# Patient Record
Sex: MALE | Race: WHITE | NOT HISPANIC OR LATINO | Employment: FULL TIME | URBAN - METROPOLITAN AREA
[De-identification: names, ages, dates, MRNs, and addresses within clinical notes are randomized per-mention and may not be internally consistent; named-entity substitution may affect disease eponyms.]

---

## 2019-07-13 ENCOUNTER — HOSPITAL ENCOUNTER (EMERGENCY)
Facility: HOSPITAL | Age: 44
Discharge: HOME | End: 2019-07-13
Attending: FAMILY MEDICINE
Payer: MEDICAID

## 2019-07-13 VITALS
WEIGHT: 150 LBS | DIASTOLIC BLOOD PRESSURE: 87 MMHG | HEIGHT: 70 IN | BODY MASS INDEX: 21.47 KG/M2 | HEART RATE: 78 BPM | TEMPERATURE: 98 F | OXYGEN SATURATION: 98 % | RESPIRATION RATE: 18 BRPM | SYSTOLIC BLOOD PRESSURE: 134 MMHG

## 2019-07-13 DIAGNOSIS — S00.83XA CONTUSION OF FACE, INITIAL ENCOUNTER: Primary | ICD-10-CM

## 2019-07-13 DIAGNOSIS — S01.511A LIP LACERATION, INITIAL ENCOUNTER: ICD-10-CM

## 2019-07-13 DIAGNOSIS — S02.40EA CLOSED FRACTURE OF RIGHT ZYGOMATIC ARCH, INITIAL ENCOUNTER: ICD-10-CM

## 2019-07-13 PROCEDURE — 90715 TDAP VACCINE 7 YRS/> IM: CPT | Mod: ER | Performed by: FAMILY MEDICINE

## 2019-07-13 PROCEDURE — 96372 THER/PROPH/DIAG INJ SC/IM: CPT | Mod: ER

## 2019-07-13 PROCEDURE — 90471 IMMUNIZATION ADMIN: CPT | Mod: ER | Performed by: FAMILY MEDICINE

## 2019-07-13 PROCEDURE — 63600175 PHARM REV CODE 636 W HCPCS: Mod: ER | Performed by: FAMILY MEDICINE

## 2019-07-13 PROCEDURE — 99284 EMERGENCY DEPT VISIT MOD MDM: CPT | Mod: 25,ER

## 2019-07-13 PROCEDURE — 25000003 PHARM REV CODE 250: Mod: ER | Performed by: FAMILY MEDICINE

## 2019-07-13 RX ORDER — DICLOFENAC SODIUM 50 MG/1
50 TABLET, DELAYED RELEASE ORAL 2 TIMES DAILY PRN
Qty: 30 TABLET | Refills: 0 | Status: SHIPPED | OUTPATIENT
Start: 2019-07-13 | End: 2020-08-09 | Stop reason: CLARIF

## 2019-07-13 RX ORDER — HYDROCODONE BITARTRATE AND ACETAMINOPHEN 5; 325 MG/1; MG/1
1 TABLET ORAL EVERY 8 HOURS PRN
Qty: 15 TABLET | Refills: 0 | Status: ON HOLD | OUTPATIENT
Start: 2019-07-13 | End: 2019-10-07 | Stop reason: CLARIF

## 2019-07-13 RX ORDER — KETOROLAC TROMETHAMINE 30 MG/ML
60 INJECTION, SOLUTION INTRAMUSCULAR; INTRAVENOUS
Status: COMPLETED | OUTPATIENT
Start: 2019-07-13 | End: 2019-07-13

## 2019-07-13 RX ORDER — HYDROCODONE BITARTRATE AND ACETAMINOPHEN 5; 325 MG/1; MG/1
1 TABLET ORAL
Status: COMPLETED | OUTPATIENT
Start: 2019-07-13 | End: 2019-07-13

## 2019-07-13 RX ORDER — SULFAMETHOXAZOLE AND TRIMETHOPRIM 800; 160 MG/1; MG/1
1 TABLET ORAL 2 TIMES DAILY
Qty: 20 TABLET | Refills: 0 | Status: SHIPPED | OUTPATIENT
Start: 2019-07-13 | End: 2019-07-23

## 2019-07-13 RX ORDER — SULFAMETHOXAZOLE AND TRIMETHOPRIM 800; 160 MG/1; MG/1
1 TABLET ORAL
Status: COMPLETED | OUTPATIENT
Start: 2019-07-13 | End: 2019-07-13

## 2019-07-13 RX ADMIN — SULFAMETHOXAZOLE AND TRIMETHOPRIM 1 TABLET: 800; 160 TABLET ORAL at 10:07

## 2019-07-13 RX ADMIN — KETOROLAC TROMETHAMINE 60 MG: 30 INJECTION, SOLUTION INTRAMUSCULAR; INTRAVENOUS at 09:07

## 2019-07-13 RX ADMIN — CLOSTRIDIUM TETANI TOXOID ANTIGEN (FORMALDEHYDE INACTIVATED), CORYNEBACTERIUM DIPHTHERIAE TOXOID ANTIGEN (FORMALDEHYDE INACTIVATED), BORDETELLA PERTUSSIS TOXOID ANTIGEN (GLUTARALDEHYDE INACTIVATED), BORDETELLA PERTUSSIS FILAMENTOUS HEMAGGLUTININ ANTIGEN (FORMALDEHYDE INACTIVATED), BORDETELLA PERTUSSIS PERTACTIN ANTIGEN, AND BORDETELLA PERTUSSIS FIMBRIAE 2/3 ANTIGEN 0.5 ML: 5; 2; 2.5; 5; 3; 5 INJECTION, SUSPENSION INTRAMUSCULAR at 10:07

## 2019-07-13 RX ADMIN — HYDROCODONE BITARTRATE AND ACETAMINOPHEN 1 TABLET: 5; 325 TABLET ORAL at 11:07

## 2019-07-14 NOTE — ED NOTES
"Pt states "I can't sit here all day. If I don't get something done soon I want to leave." NAD noted. Informed pt radiology tech waiting to take him to CT scan.   "

## 2019-07-14 NOTE — ED PROVIDER NOTES
Encounter Date: 7/13/2019       History     Chief Complaint   Patient presents with    Fall     Patient reports he slipped on some steps about 2 hours ago and hit the right side of head and lip on the concrete slab, patient's significiant other reports patient has a hx of seizures and she is not sure if patient slipped and fell or had a seizure.      43-year-old male had a syncopal episode and sustained injury to his phase.  He hit concrete steps.  Patient blacked out for few seconds.  He did know what happened.  He had history of seizures but did not have postictal state today.  Is not on any medications.  His seizure free for last 1 year.  Small cut to upper lip with bruising.  No bleeding from mouth or nose. Patient is awake alert and oriented on arrival to ED with normal ambulation.  No vomiting. No headache. No neck pain.     The history is provided by the patient.     Review of patient's allergies indicates:  No Known Allergies  Past Medical History:   Diagnosis Date    Hypertension     Pancreatitis     Seizures      History reviewed. No pertinent surgical history.  History reviewed. No pertinent family history.  Social History     Tobacco Use    Smoking status: Current Every Day Smoker     Packs/day: 0.50     Types: Cigarettes    Smokeless tobacco: Never Used   Substance Use Topics    Alcohol use: Yes     Alcohol/week: 3.6 oz     Types: 6 Cans of beer per week     Comment: reports 6 pack of beer every other day    Drug use: Yes     Types: Marijuana     Comment: occassionally     Review of Systems   Constitutional: Negative for activity change, appetite change, chills and fever.   HENT: Negative for congestion, ear discharge, rhinorrhea, sinus pressure, sinus pain, sore throat and trouble swallowing.    Eyes: Negative for photophobia, pain, discharge, redness, itching and visual disturbance.   Respiratory: Negative for cough, chest tightness, shortness of breath and wheezing.    Cardiovascular:  Negative for chest pain, palpitations and leg swelling.   Gastrointestinal: Negative for abdominal distention, abdominal pain, constipation, diarrhea, nausea and vomiting.   Genitourinary: Negative for dysuria, flank pain, frequency and hematuria.   Musculoskeletal: Negative for back pain, gait problem, neck pain and neck stiffness.   Skin: Negative for rash and wound.   Neurological: Negative for dizziness, tremors, seizures, syncope, speech difficulty, weakness, light-headedness, numbness and headaches.   Psychiatric/Behavioral: Negative for behavioral problems, confusion, hallucinations and sleep disturbance. The patient is not nervous/anxious.    All other systems reviewed and are negative.      Physical Exam     Initial Vitals [07/13/19 2042]   BP Pulse Resp Temp SpO2   (!) 160/99 101 18 97.9 °F (36.6 °C) 99 %      MAP       --         Physical Exam    Nursing note and vitals reviewed.  Constitutional: Vital signs are normal. He appears well-developed and well-nourished. He is active.   HENT:   Head: Normocephalic and atraumatic.       Right Ear: External ear normal.   Left Ear: External ear normal.   Nose: Nose normal.   Mouth/Throat: Oropharynx is clear and moist and mucous membranes are normal.       0.5 cm laceration to upper lip with surrounding contusion.  Right temple area contusion.   Eyes: Conjunctivae, EOM and lids are normal. Pupils are equal, round, and reactive to light.   Neck: Trachea normal, normal range of motion and full passive range of motion without pain. Neck supple. Normal range of motion present. No neck rigidity.   Cardiovascular: Normal rate, regular rhythm, S1 normal, S2 normal, normal heart sounds, intact distal pulses and normal pulses.   Pulmonary/Chest: Breath sounds normal. No respiratory distress. He has no wheezes. He has no rhonchi. He has no rales. He exhibits no tenderness.   Abdominal: Soft. Normal appearance and bowel sounds are normal. He exhibits no distension. There is  no tenderness. There is no rebound and no guarding.   Musculoskeletal: Normal range of motion.   Lymphadenopathy:     He has no cervical adenopathy.   Neurological: He is alert and oriented to person, place, and time. He has normal strength and normal reflexes. No cranial nerve deficit or sensory deficit. GCS score is 15. GCS eye subscore is 4. GCS verbal subscore is 5. GCS motor subscore is 6.   Skin: Skin is warm and intact. Capillary refill takes less than 2 seconds. No abrasion, no bruising and no rash noted.   Psychiatric: He has a normal mood and affect. His speech is normal and behavior is normal. Judgment and thought content normal. He is not actively hallucinating. Cognition and memory are normal. He is attentive.         ED Course   Procedures  Labs Reviewed - No data to display       Imaging Results    None          Medical Decision Making:   Initial Assessment:   Slip and fall with contusion to face.  Small lip laceration.  Differential Diagnosis:   Facial fractures, contusion of face.  Laceration of lip, contusion of lip.  Clinical Tests:   Radiological Study: Ordered and Reviewed  ED Management:  Right is atraumatic arch with fracture.  Patient has been treated with Toradol, Norco, Tdap.  [facility]erral [facility].  Patient is also advised to [facility] if he could not wait for the appointment.  Continue pain medication at home along with soft and liquid diet.  Follow up ER with any worsening pain. Nausea, vomiting, seizures.                      Clinical Impression:       ICD-10-CM ICD-9-CM   1. Contusion of face, initial encounter S00.83XA 920   2. Lip laceration, initial encounter S01.511A 873.43   3. Closed fracture of right zygomatic arch, initial encounter S02.40EA 802.4         Disposition:   Disposition: Discharged  Condition: Stable                        Rufino Marcum MD  07/14/19 8672

## 2019-08-07 ENCOUNTER — HOSPITAL ENCOUNTER (EMERGENCY)
Facility: HOSPITAL | Age: 44
Discharge: HOME | End: 2019-08-07
Attending: EMERGENCY MEDICINE
Payer: MEDICAID

## 2019-08-07 VITALS
DIASTOLIC BLOOD PRESSURE: 111 MMHG | TEMPERATURE: 97 F | WEIGHT: 150 LBS | BODY MASS INDEX: 21.47 KG/M2 | OXYGEN SATURATION: 95 % | RESPIRATION RATE: 16 BRPM | HEART RATE: 113 BPM | HEIGHT: 70 IN | SYSTOLIC BLOOD PRESSURE: 185 MMHG

## 2019-08-07 DIAGNOSIS — F10.20 ALCOHOLISM: Primary | ICD-10-CM

## 2019-08-07 PROCEDURE — 99281 EMR DPT VST MAYX REQ PHY/QHP: CPT

## 2019-08-07 RX ORDER — PHENYTOIN SODIUM 100 MG/1
CAPSULE, EXTENDED RELEASE ORAL 3 TIMES DAILY
COMMUNITY
End: 2020-08-09 | Stop reason: CLARIF

## 2019-08-07 RX ORDER — ALPRAZOLAM 1 MG/1
1 TABLET ORAL 2 TIMES DAILY
Status: ON HOLD | COMMUNITY
End: 2019-10-07 | Stop reason: CLARIF

## 2019-08-08 NOTE — ED PROVIDER NOTES
Encounter Date: 8/7/2019    SCRIBE #1 NOTE: I, Amado Bland, am scribing for, and in the presence of, Christian Regalado MD.       History     Chief Complaint   Patient presents with    Detox     pt states I am in real bad shape with my drinking, reports drinking 1/5 per day. Pt states he wants to get help getting detoxed. + for etoh currently       Time seen by provider: 8:00 PM on 08/07/2019    Addy Alberto is a 43 y.o. male with seizures, pancreatitis, HTN, who presents to the ED with an onset of alcohol intoxication. The patiens states he drinks at least a fifth of alcohol per day, sometimes more. He reports drinking 1.5 pints of alcohol the day of arrival, and acknowledges that he's currently intoxicated. He states he has a history of seizures, and notes he takes Dilantin to treat the symptoms. He reports going to rehab on the [facility] ~3 weeks PTA, but only staying 2 days. The patient denies any other symptoms at this time. No PSHx noted. A drug allergy to penicillin cited.    The history is provided by the patient.     Review of patient's allergies indicates:   Allergen Reactions    Pcn [penicillins]      Past Medical History:   Diagnosis Date    Hypertension     Pancreatitis     Seizures      No past surgical history on file.  No family history on file.  Social History     Tobacco Use    Smoking status: Current Every Day Smoker     Packs/day: 0.50     Types: Cigarettes    Smokeless tobacco: Never Used   Substance Use Topics    Alcohol use: Yes     Alcohol/week: 3.6 oz     Types: 6 Cans of beer per week     Comment: reports 6 pack of beer every other day    Drug use: Yes     Types: Marijuana     Comment: occassionally     Review of Systems   Constitutional: Negative for activity change, diaphoresis and fever.        +EtOH intoxication   HENT: Negative for drooling, rhinorrhea, sore throat and trouble swallowing.    Eyes: Negative for pain and visual disturbance.   Respiratory: Negative for cough,  shortness of breath and stridor.    Cardiovascular: Negative for chest pain and leg swelling.   Gastrointestinal: Negative for abdominal distention, abdominal pain, constipation and vomiting.   Genitourinary: Negative for discharge, dysuria and hematuria.   Musculoskeletal: Negative for gait problem.   Skin: Negative for rash.   Neurological: Negative for seizures, facial asymmetry and headaches.   Psychiatric/Behavioral: Negative for hallucinations and suicidal ideas.       Physical Exam     Initial Vitals [08/07/19 1928]   BP Pulse Resp Temp SpO2   (!) 177/114 (!) 113 16 96.9 °F (36.1 °C) 95 %      MAP       --         Physical Exam    Nursing note and vitals reviewed.  Constitutional: He appears well-developed. No distress.   Smells of EtOH, and anxious.    HENT:   Head: Normocephalic and atraumatic.   Nose: Nose normal.   Eyes: EOM are normal.   Neck: Neck supple. No tracheal deviation present. No JVD present.   Cardiovascular: Regular rhythm, normal heart sounds and intact distal pulses. Tachycardia present.  Exam reveals no gallop and no friction rub.    No murmur heard.  Pulmonary/Chest: Breath sounds normal. No respiratory distress. He has no wheezes. He has no rhonchi. He has no rales.   Abdominal: Soft. Bowel sounds are normal. There is no tenderness.   Musculoskeletal: Normal range of motion.   Neurological: He is alert and oriented to person, place, and time. No cranial nerve deficit.   Skin: Skin is warm and dry. Capillary refill takes less than 2 seconds. No rash noted.   Psychiatric: He has a normal mood and affect.         ED Course   Procedures  Labs Reviewed - No data to display       Imaging Results    None          Medical Decision Making:   History:   Old Medical Records: I decided to obtain old medical records.  ED Management:  This patient appears mildly intoxicated.  They admit to using alcohol today.  Patient's mental status correlates with alcohol use.  There is no evidence of new trauma on  full head-to-toe exam.  Patients father is with him asking for options for detox. Ambulating without difficulty. Pt and father given resources for detox. Stable for discharge.            Scribe Attestation:   Scribe #1: I performed the above scribed service and the documentation accurately describes the services I performed. I attest to the accuracy of the note.      Attending Attestation:     Physician Attestation for Scribe:    I, Dr. Christian Regalado, personally performed the services described in this documentation.   All medical record entries made by the scribe were at my direction and in my presence.   I have reviewed the chart and agree that the record is accurate and complete.   Christian Regalado MD  10:41 PM 08/07/2019     DISCLAIMER: This note was prepared with [employer] voice recognition transcription software. Garbled syntax, mangled pronouns, and other bizarre constructions may be attributed to that software system.             Clinical Impression:       ICD-10-CM ICD-9-CM   1. Alcoholism F10.20 303.90         Disposition:   Disposition: Discharged  Condition: Stable                        Christian Regalado MD  08/07/19 2244

## 2019-08-08 NOTE — ED NOTES
Assumed care:  Addy Alberto awake, alert and oriented x 3, skin warm and dry, in NAD with family at bedside.  Patient states that he drinks a fifth per day and wants to stop.  Last drink 30 minutes ago.    Patient identifiers for Addy Alberto checked and correct.  LOC:  Addy Alberto is awake, alert, and aware of environment with an appropriate affect. He is oriented x 3 and speaking appropriately.  APPEARANCE:  He is resting comfortably and in no acute distress. He is clean and well groomed, patient's clothing is properly fastened.  SKIN:  The skin is warm and dry. He has normal skin turgor and moist mucus membranes. Skin is intact; no bruising or breakdown noted.  MUSCULOSKELETAL:  He is moving all extremities well, no obvious deformities noted. Pulses intact.   RESPIRATORY:  Airway is open and patent. Respirations are spontaneous and non-labored with normal effort and rate.  CARDIAC:  tachycardia. No peripheral edema noted. Capillary refill < 3 seconds.  ABDOMEN:  No distention noted.  Soft and non-tender upon palpation.  NEUROLOGICAL:  PERRL. Facial expression is symmetrical. Hand grasps are equal bilaterally. Normal sensation in all extremities when touched with finger.  Allergies reported:    Review of patient's allergies indicates:   Allergen Reactions    Pcn [penicillins]      OTHER NOTES:

## 2019-08-12 ENCOUNTER — HOSPITAL ENCOUNTER (EMERGENCY)
Facility: HOSPITAL | Age: 44
Discharge: HOME | End: 2019-08-12
Attending: EMERGENCY MEDICINE
Payer: MEDICAID

## 2019-08-12 VITALS
TEMPERATURE: 98 F | SYSTOLIC BLOOD PRESSURE: 164 MMHG | BODY MASS INDEX: 21.47 KG/M2 | HEART RATE: 75 BPM | HEIGHT: 70 IN | RESPIRATION RATE: 18 BRPM | DIASTOLIC BLOOD PRESSURE: 88 MMHG | OXYGEN SATURATION: 99 % | WEIGHT: 150 LBS

## 2019-08-12 DIAGNOSIS — I10 HYPERTENSION, UNSPECIFIED TYPE: Primary | ICD-10-CM

## 2019-08-12 DIAGNOSIS — R07.9 CHEST PAIN: ICD-10-CM

## 2019-08-12 LAB
ALBUMIN SERPL BCP-MCNC: 3.9 G/DL (ref 3.5–5.2)
ALP SERPL-CCNC: 84 U/L (ref 55–135)
ALT SERPL W/O P-5'-P-CCNC: 75 U/L (ref 10–44)
AMPHET+METHAMPHET UR QL: NEGATIVE
ANION GAP SERPL CALC-SCNC: 9 MMOL/L (ref 8–16)
APAP SERPL-MCNC: <10 UG/ML (ref 10–20)
AST SERPL-CCNC: 93 U/L (ref 10–40)
BARBITURATES UR QL SCN>200 NG/ML: NORMAL
BASOPHILS # BLD AUTO: 0.06 K/UL (ref 0–0.2)
BASOPHILS NFR BLD: 0.8 % (ref 0–1.9)
BENZODIAZ UR QL SCN>200 NG/ML: NEGATIVE
BILIRUB SERPL-MCNC: 0.7 MG/DL (ref 0.1–1)
BNP SERPL-MCNC: 69 PG/ML (ref 0–99)
BUN SERPL-MCNC: 9 MG/DL (ref 6–20)
BZE UR QL SCN: NEGATIVE
CALCIUM SERPL-MCNC: 9.7 MG/DL (ref 8.7–10.5)
CANNABINOIDS UR QL SCN: NORMAL
CHLORIDE SERPL-SCNC: 99 MMOL/L (ref 95–110)
CO2 SERPL-SCNC: 28 MMOL/L (ref 23–29)
CREAT SERPL-MCNC: 1 MG/DL (ref 0.5–1.4)
CREAT UR-MCNC: 67 MG/DL (ref 23–375)
DIFFERENTIAL METHOD: ABNORMAL
EOSINOPHIL # BLD AUTO: 0.1 K/UL (ref 0–0.5)
EOSINOPHIL NFR BLD: 0.8 % (ref 0–8)
ERYTHROCYTE [DISTWIDTH] IN BLOOD BY AUTOMATED COUNT: 15.4 % (ref 11.5–14.5)
EST. GFR  (AFRICAN AMERICAN): >60 ML/MIN/1.73 M^2
EST. GFR  (NON AFRICAN AMERICAN): >60 ML/MIN/1.73 M^2
GLUCOSE SERPL-MCNC: 116 MG/DL (ref 70–110)
HCT VFR BLD AUTO: 44.9 % (ref 40–54)
HGB BLD-MCNC: 14.9 G/DL (ref 14–18)
IMM GRANULOCYTES # BLD AUTO: 0.04 K/UL (ref 0–0.04)
IMM GRANULOCYTES NFR BLD AUTO: 0.5 % (ref 0–0.5)
INR PPP: 0.9
LYMPHOCYTES # BLD AUTO: 1.6 K/UL (ref 1–4.8)
LYMPHOCYTES NFR BLD: 20.1 % (ref 18–48)
MCH RBC QN AUTO: 33.8 PG (ref 27–31)
MCHC RBC AUTO-ENTMCNC: 33.2 G/DL (ref 32–36)
MCV RBC AUTO: 102 FL (ref 82–98)
MONOCYTES # BLD AUTO: 0.9 K/UL (ref 0.3–1)
MONOCYTES NFR BLD: 11.9 % (ref 4–15)
NEUTROPHILS # BLD AUTO: 5.1 K/UL (ref 1.8–7.7)
NEUTROPHILS NFR BLD: 65.9 % (ref 38–73)
NRBC BLD-RTO: 0 /100 WBC
OPIATES UR QL SCN: NEGATIVE
PCP UR QL SCN>25 NG/ML: NEGATIVE
PLATELET # BLD AUTO: 355 K/UL (ref 150–350)
PMV BLD AUTO: 9.3 FL (ref 9.2–12.9)
POTASSIUM SERPL-SCNC: 4.6 MMOL/L (ref 3.5–5.1)
PROT SERPL-MCNC: 8 G/DL (ref 6–8.4)
PROTHROMBIN TIME: 12.1 SEC (ref 11.7–14)
RBC # BLD AUTO: 4.41 M/UL (ref 4.6–6.2)
SALICYLATES SERPL-MCNC: 14.1 MG/DL (ref 15–30)
SODIUM SERPL-SCNC: 136 MMOL/L (ref 136–145)
TOXICOLOGY INFORMATION: NORMAL
TROPONIN I SERPL DL<=0.01 NG/ML-MCNC: <0.03 NG/ML (ref 0.02–0.04)
WBC # BLD AUTO: 7.75 K/UL (ref 3.9–12.7)

## 2019-08-12 PROCEDURE — 84484 ASSAY OF TROPONIN QUANT: CPT

## 2019-08-12 PROCEDURE — 80053 COMPREHEN METABOLIC PANEL: CPT

## 2019-08-12 PROCEDURE — 83880 ASSAY OF NATRIURETIC PEPTIDE: CPT

## 2019-08-12 PROCEDURE — 80307 DRUG TEST PRSMV CHEM ANLYZR: CPT

## 2019-08-12 PROCEDURE — 93005 ELECTROCARDIOGRAM TRACING: CPT

## 2019-08-12 PROCEDURE — 99285 EMERGENCY DEPT VISIT HI MDM: CPT

## 2019-08-12 PROCEDURE — 85610 PROTHROMBIN TIME: CPT

## 2019-08-12 PROCEDURE — 85025 COMPLETE CBC W/AUTO DIFF WBC: CPT

## 2019-08-12 PROCEDURE — 25000003 PHARM REV CODE 250: Performed by: EMERGENCY MEDICINE

## 2019-08-12 RX ORDER — HYDROCHLOROTHIAZIDE 25 MG/1
25 TABLET ORAL DAILY
Qty: 30 TABLET | Refills: 0 | Status: SHIPPED | OUTPATIENT
Start: 2019-08-12 | End: 2020-08-11

## 2019-08-12 RX ORDER — PHENYTOIN SODIUM 100 MG/1
CAPSULE, EXTENDED RELEASE ORAL 3 TIMES DAILY
COMMUNITY
End: 2019-10-04 | Stop reason: SDUPTHER

## 2019-08-12 RX ORDER — CLONIDINE HYDROCHLORIDE 0.1 MG/1
0.1 TABLET ORAL
Status: COMPLETED | OUTPATIENT
Start: 2019-08-12 | End: 2019-08-12

## 2019-08-12 RX ADMIN — CLONIDINE HYDROCHLORIDE 0.1 MG: 0.1 TABLET ORAL at 03:08

## 2019-08-13 NOTE — ED PROVIDER NOTES
Encounter Date: 8/12/2019       History     Chief Complaint   Patient presents with    Hypertension     for 6 days, headache     Chief complaint is mild throbbing headache located occipital area and base of the neck area.  The patient is here basically because he has been checking his blood pressure closely over the last week plus he has decreased his regular alcohol intake trying to get off alcohol.  At the present time his blood pressures in the 160 range and his headache is gone.  He states he feels well. He denies any earache sore throat runny nose fever chills.  He does complain of some clear eye drainage over the last week.  No vomiting no diarrhea no troubles urinating no chest pain or shortness of breath. No weakness. No confusion        Review of patient's allergies indicates:   Allergen Reactions    Penicillins      Past Medical History:   Diagnosis Date    Pancreatitis     Seizures      History reviewed. No pertinent surgical history.  History reviewed. No pertinent family history.  Social History     Tobacco Use    Smoking status: Current Some Day Smoker     Packs/day: 1.00     Types: Cigarettes   Substance Use Topics    Alcohol use: Yes     Comment: states 1 week detoxing self of etoh, couple pints daily     Drug use: Not on file     Review of Systems   Constitutional: Negative for chills and fever.   HENT: Negative for ear pain, rhinorrhea and sore throat.    Eyes: Negative for pain and visual disturbance.   Respiratory: Negative for cough and shortness of breath.    Cardiovascular: Negative for chest pain and palpitations.   Gastrointestinal: Negative for abdominal pain, constipation, diarrhea, nausea and vomiting.   Genitourinary: Negative for dysuria, frequency, hematuria and urgency.   Musculoskeletal: Negative for back pain, joint swelling and myalgias.   Skin: Negative for rash.   Neurological: Positive for headaches. Negative for dizziness, seizures and weakness.   Psychiatric/Behavioral:  Negative for dysphoric mood. The patient is not nervous/anxious.        Physical Exam     Initial Vitals [08/12/19 1502]   BP Pulse Resp Temp SpO2   (!) 212/122 86 18 97.9 °F (36.6 °C) 100 %      MAP       --         Physical Exam    Nursing note and vitals reviewed.  Constitutional: He appears well-developed and well-nourished.   HENT:   Head: Normocephalic and atraumatic.   Eyes: Conjunctivae, EOM and lids are normal. Pupils are equal, round, and reactive to light.   Neck: Trachea normal. Neck supple. No thyroid mass present.   Cardiovascular: Normal rate, regular rhythm and normal heart sounds.   Pulmonary/Chest: Breath sounds normal. No respiratory distress.   Abdominal: Soft. Bowel sounds are normal. There is no tenderness.   Musculoskeletal: Normal range of motion.   Neurological: He is alert and oriented to person, place, and time. He has normal strength and normal reflexes. No cranial nerve deficit or sensory deficit.   Skin: Skin is warm and dry.   Psychiatric: He has a normal mood and affect. His speech is normal and behavior is normal. Judgment and thought content normal.         ED Course   Procedures  Labs Reviewed   CBC W/ AUTO DIFFERENTIAL - Abnormal; Notable for the following components:       Result Value    RBC 4.41 (*)     Mean Corpuscular Volume 102 (*)     Mean Corpuscular Hemoglobin 33.8 (*)     RDW 15.4 (*)     Platelets 355 (*)     All other components within normal limits   COMPREHENSIVE METABOLIC PANEL - Abnormal; Notable for the following components:    Glucose 116 (*)     AST 93 (*)     ALT 75 (*)     All other components within normal limits   SALICYLATE LEVEL - Abnormal; Notable for the following components:    Salicylate Lvl 14.1 (*)     All other components within normal limits   B-TYPE NATRIURETIC PEPTIDE   TROPONIN I   PROTIME-INR   ACETAMINOPHEN LEVEL   DRUG SCREEN PANEL, URINE EMERGENCY    Narrative:     Specimen Source->Urine        ECG Results          EKG 12-lead (In process)   Result time 08/12/19 18:39:40    In process by Interface, Lab In [facility] (08/12/19 18:39:40)                 Narrative:    Test Reason : R07.9,    Vent. Rate : 080 BPM     Atrial Rate : 080 BPM     P-R Int : 128 ms          QRS Dur : 098 ms      QT Int : 380 ms       P-R-T Axes : 043 083 031 degrees     QTc Int : 438 ms    Normal sinus rhythm  Normal ECG  No previous ECGs available    Referred By: [email or web address]   SELF           Confirmed By:                             Imaging Results          X-Ray Chest PA And Lateral (Final result)  Result time 08/12/19 16:05:16    Final result by Mykel Teague MD (08/12/19 16:05:16)                 Impression:      No acute cardiac or pulmonary process.      Electronically signed by: Mykel Teague MD  Date:    08/12/2019  Time:    16:05             Narrative:    CLINICAL HISTORY:  ([id number])42 y/o  (1975) M    Chest Pain;    TECHNIQUE:  (A#[id number], exam time 8/12/2019 16:04)    XR CHEST PA AND LATERAL [id number]    COMPARISON:  None available.    FINDINGS:  The lungs are clear. Costophrenic angles are seen without effusion. No pneumothorax is identified. The heart is normal in size. The mediastinum is within normal limits. Osseous structures appear within normal limits. The visualized upper abdomen is unremarkable.                               CT Head Without Contrast (Final result)  Result time 08/12/19 15:59:28    Final result by Mykel Teague MD (08/12/19 15:59:28)                 Impression:      No acute intracranial process      Electronically signed by: Mykel Teague MD  Date:    08/12/2019  Time:    15:59             Narrative:    CLINICAL HISTORY:  ([id number] y/o  (1975) M    Abnormal blood-pressure reading, without diagnosis;    TECHNIQUE:  (A#[id number], exam time 8/12/2019 15:54)    CT HEAD WITHOUT CONTRAST [id number]    Axial CT of the brain without contrast using soft tissue and bone algorithm. Please note in the acute setting if there is a  clinical concern for an acute stroke MRI would be more sensitive/specific for evaluation of ischemia.    CMS MANDATED QUALITY DATA - CT RADIATION - 436    All CT scans at this facility utilize dose modulation, iterative reconstruction, and/or weight based dosing when appropriate to reduce radiation dose to as low as reasonably achievable.    COMPARISON:  None available.    FINDINGS:  No acute intracranial hemorrhage, edema or mass effect, and no acute parenchymal abnormality. There is no hydrocephalus, herniation or midline shift, and the basal and suprasellar cisterns are within normal limits. The osseous structures show no acute skull fracture. The ventricles and sulci are normal. There is normal gray white differentiation. Orbital contents appear within normal limits. External auditory canals are unremarkable. The visualized paranasal sinuses and mastoid air cells are essentially clear.                                                      Clinical Impression:       ICD-10-CM ICD-9-CM   1. Hypertension, unspecified type I10 401.9   2. Chest pain R07.9 786.50                                Lolis Santizo MD  08/12/19 8045

## 2019-08-13 NOTE — DISCHARGE INSTRUCTIONS
Please return for severe headache nausea vomiting weakness trouble with urine vision confusion chest pain

## 2019-08-16 LAB
BARBITURATES UR QL SCN: POSITIVE NG/ML
LABORATORY COMMENT REPORT: ABNORMAL

## 2019-09-10 DIAGNOSIS — G40.909 EPILEPSY, UNSPECIFIED, NOT INTRACTABLE, WITHOUT STATUS EPILEPTICUS: Primary | ICD-10-CM

## 2019-10-02 PROBLEM — R07.9 CHEST PAIN: Status: ACTIVE | Noted: 2019-10-02

## 2019-10-07 PROBLEM — I10 ESSENTIAL HYPERTENSION: Status: ACTIVE | Noted: 2019-10-07

## 2019-10-07 PROBLEM — I50.31 ACUTE DIASTOLIC HEART FAILURE: Status: ACTIVE | Noted: 2019-10-07

## 2019-10-07 PROBLEM — F10.10 ETOH ABUSE: Status: ACTIVE | Noted: 2019-10-07

## 2019-10-13 ENCOUNTER — NURSE TRIAGE (OUTPATIENT)
Dept: ADMINISTRATIVE | Facility: CLINIC | Age: 44
End: 2019-10-13

## 2019-10-13 NOTE — TELEPHONE ENCOUNTER
Sister is calling because pt is having severe pain in right arm between elbow and wrist, she also states he has a lot of bruising now. Pt states that the pain feels like it goes all the way to the bone, advised her to take him to the ER for provider assessment, caller agreed.     Reason for Disposition   Severe pain in the incision    Additional Information   Negative: [1] Major abdominal surgical incision AND [2] wound gaping open AND [3] visible internal organs   Negative: Sounds like a life-threatening emergency to the triager   Negative: [1] Bleeding from incision AND [2] won't stop after 10 minutes of direct pressure   Negative: [1] Widespread rash AND [2] bright red, sunburn-like    Protocols used: POST-OP INCISION SYMPTOMS-A-AH

## 2019-10-15 ENCOUNTER — HOSPITAL ENCOUNTER (EMERGENCY)
Facility: HOSPITAL | Age: 44
Discharge: HOME | End: 2019-10-15
Attending: FAMILY MEDICINE
Payer: MEDICAID

## 2019-10-15 VITALS
SYSTOLIC BLOOD PRESSURE: 116 MMHG | OXYGEN SATURATION: 98 % | TEMPERATURE: 98 F | RESPIRATION RATE: 17 BRPM | WEIGHT: 150 LBS | HEART RATE: 102 BPM | HEIGHT: 70 IN | DIASTOLIC BLOOD PRESSURE: 86 MMHG | BODY MASS INDEX: 21.47 KG/M2

## 2019-10-15 DIAGNOSIS — M79.603 ARM PAIN: ICD-10-CM

## 2019-10-15 DIAGNOSIS — I70.208 RADIAL ARTERY OCCLUSION, RIGHT: Primary | ICD-10-CM

## 2019-10-15 PROCEDURE — 99284 EMERGENCY DEPT VISIT MOD MDM: CPT | Mod: 25,ER

## 2019-10-15 PROCEDURE — 25000003 PHARM REV CODE 250: Mod: ER | Performed by: PHYSICIAN ASSISTANT

## 2019-10-15 RX ORDER — HYDROCODONE BITARTRATE AND ACETAMINOPHEN 5; 325 MG/1; MG/1
1 TABLET ORAL EVERY 6 HOURS PRN
Qty: 12 TABLET | Refills: 0 | Status: SHIPPED | OUTPATIENT
Start: 2019-10-15 | End: 2019-10-18

## 2019-10-15 RX ORDER — HYDROCODONE BITARTRATE AND ACETAMINOPHEN 5; 325 MG/1; MG/1
1 TABLET ORAL
Status: COMPLETED | OUTPATIENT
Start: 2019-10-15 | End: 2019-10-15

## 2019-10-15 RX ADMIN — HYDROCODONE BITARTRATE AND ACETAMINOPHEN 1 TABLET: 5; 325 TABLET ORAL at 05:10

## 2019-10-15 NOTE — ED PROVIDER NOTES
Encounter Date: 10/15/2019       History     Chief Complaint   Patient presents with    Arm Pain     Pt states had angiogram last week and has had continued pain to right wrist/arm after procedure.  Pt had f/u appt today and office called and rescheduled for Thursday.       44-year-old male presents to the emergency department for evaluation of one-week history of right forearm pain status post angiogram.  He reports that he had an angiogram performed on 10/07/2019 by Dr. Ge and has had a constant, sharp, achy pain in his forearm and wrist since that time.  He denies any numbness, tingling, weakness or swelling to the hands.  He has attempted treatment at home with Tylenol with no relief of symptoms.  He reports that he was post have a follow-up with his cardiologist today, however it got rescheduled and he wanted to get his arm evaluated.  He reports his last dose of Tylenol was this morning.  He denies any associated symptoms including fever, headache, dizziness, chest pain, shortness of breath, abdominal pain or vomiting. Patient reports that he had a PICC line and arterial line placed in that arm during the procedure.        Review of patient's allergies indicates:   Allergen Reactions    Penicillins      Past Medical History:   Diagnosis Date    Hypertension     Pancreatitis     Seizures      Past Surgical History:   Procedure Laterality Date    LEFT HEART CATHETERIZATION Left 10/7/2019    Procedure: Left heart cath;  Surgeon: Sergio Ge MD;  Location: [facility] CATH LAB;  Service: Cardiology;  Laterality: Left;     History reviewed. No pertinent family history.  Social History     Tobacco Use    Smoking status: Current Some Day Smoker     Packs/day: 1.00     Types: Cigarettes   Substance Use Topics    Alcohol use: Yes     Comment: states 1 week detoxing self of etoh, couple pints daily     Drug use: Yes     Types: Marijuana     Comment: occassionally     Review of Systems   Constitutional:  Negative for activity change, appetite change and fever.   HENT: Negative for congestion, nosebleeds, rhinorrhea, sore throat, trouble swallowing and voice change.    Eyes: Negative for photophobia and visual disturbance.   Respiratory: Negative for cough, shortness of breath and wheezing.    Cardiovascular: Negative for chest pain, palpitations and leg swelling.   Gastrointestinal: Negative for abdominal pain, constipation, diarrhea, nausea and vomiting.   Genitourinary: Negative for dysuria.   Musculoskeletal: Positive for arthralgias. Negative for back pain, joint swelling, neck pain and neck stiffness.   Skin: Negative for rash.   Neurological: Negative for dizziness, syncope, weakness, light-headedness, numbness and headaches.   Hematological: Does not bruise/bleed easily.       Physical Exam     Initial Vitals [10/15/19 1653]   BP Pulse Resp Temp SpO2   (!) 126/92 (!) 112 20 97.9 °F (36.6 °C) 96 %      MAP       --         Physical Exam    Nursing note and vitals reviewed.  Constitutional: He appears well-developed and well-nourished. He is not diaphoretic. No distress.   HENT:   Head: Normocephalic and atraumatic.   Right Ear: External ear normal.   Left Ear: External ear normal.   Mouth/Throat: Oropharynx is clear and moist. No oropharyngeal exudate.   Eyes: Conjunctivae and EOM are normal. Pupils are equal, round, and reactive to light.   Neck: Normal range of motion. Neck supple.   Cardiovascular: Normal rate, regular rhythm and normal heart sounds.   Pulmonary/Chest: Breath sounds normal. No respiratory distress. He has no wheezes. He has no rhonchi. He has no rales. He exhibits no tenderness.   Abdominal: Soft. He exhibits no distension. There is no tenderness. There is no rebound.   Musculoskeletal:        Right shoulder: He exhibits normal range of motion, no tenderness and no bony tenderness.        Right elbow: He exhibits normal range of motion. No tenderness found.        Right wrist: He exhibits  normal range of motion, no tenderness and no bony tenderness.        Right forearm: He exhibits tenderness. He exhibits no bony tenderness, no swelling and no edema.        Right hand: He exhibits normal range of motion, no tenderness, no bony tenderness, normal capillary refill and no swelling. Normal sensation noted. Normal strength noted.   Lymphadenopathy:     He has no cervical adenopathy.   Neurological: He is alert and oriented to person, place, and time.   Skin: Skin is warm and dry.   Psychiatric: He has a normal mood and affect.         ED Course   Procedures  Labs Reviewed - No data to display       Imaging Results    None          Medical Decision Making:   Initial Assessment:   44-year-old male presents for evaluation of acute arm pain status post angiogram.  Physical exam reveals a nontoxic-appearing male in no acute distress.  Patient is afebrile, mildly tachycardic but other vital signs within normal limits.  Neurological exam reveals an alert and oriented patient.  Examination of the right upper extremity reveals tenderness to palpation noted over the dorsal aspect of the proximal right forearm.  No erythema, edema or ecchymosis noted. No bony instability or crepitus noted.  Radial and ulnar pulses able to be palpated. Capillary refill less than 3 sec.  Differential Diagnosis:   Ultrasounds of the arteries and veins ordered to assess for possible serious etiology including thrombus.  Contusion    ED Management:  Ultrasound of the veins reveals no evidence of DVT.  Ultrasound of the arteries reveal occlusion of the distal right radial artery.  The right common carotid artery, subclavian artery, axillary artery, brachial artery and ulnar artery all patent. These findings were discussed at length with the patient verbalizes understanding and agreement course of treatment.  Instructed the patient to keep the appointment with his his cardiologist for re-evaluation in 2 days.  Instructed patient to return  to the emergency department immediately for any new or worsening symptoms.   evaluated this patient and is in agreement with the course of treatment.              Attending Attestation:     Physician Attestation Statement for NP/PA:   I have conducted a face to face encounter with this patient in addition to the NP/PA, due to Medical Complexity    Other NP/PA Attestation Additions:    History of Present Illness: Patient presents with right forearm pain following a cardiac catheterization and art line placement in the right radial artery.   Physical Exam: There are no obvious deformities of the forearm or hand.  He has good capillary refill in the fingers.  There is some tenderness in the forearm but I do not see any obvious changes.   Medical Decision Making: An ultrasound was performed on the right forearm which indicated an obstruction of the radial artery at the wrist.  The ulnar artery is patent. The patient has an appointment in 2 days with his cardiologist we will treat the patient with pain relieve in the meantime.                    Clinical Impression:       ICD-10-CM ICD-9-CM   1. Radial artery occlusion, right I70.208 444.21   2. Arm pain M79.603 729.5                                Isela Lewis PA-C  10/15/19 1910       Katty Ordonez MD  10/15/19 1930

## 2019-10-16 NOTE — DISCHARGE INSTRUCTIONS
You are instructed to keep the appointment with your  cardiologist for re-evaluation in 2 days.  You are instructed to return to the emergency department immediately for any new or worsening symptoms right

## 2019-10-18 ENCOUNTER — HOSPITAL ENCOUNTER (OUTPATIENT)
Dept: RADIOLOGY | Facility: HOSPITAL | Age: 44
Discharge: HOME | End: 2019-10-18
Attending: SPECIALIST
Payer: MEDICAID

## 2019-10-18 DIAGNOSIS — G40.909 EPILEPSY, UNSPECIFIED, NOT INTRACTABLE, WITHOUT STATUS EPILEPTICUS: ICD-10-CM

## 2019-10-18 PROCEDURE — 25500020 PHARM REV CODE 255: Mod: PO | Performed by: HOSPITALIST

## 2019-10-18 PROCEDURE — 70553 MRI BRAIN STEM W/O & W/DYE: CPT | Mod: TC,PO

## 2019-10-18 PROCEDURE — A9585 GADOBUTROL INJECTION: HCPCS | Mod: PO | Performed by: HOSPITALIST

## 2019-10-18 RX ORDER — GADOBUTROL 604.72 MG/ML
9 INJECTION INTRAVENOUS
Status: COMPLETED | OUTPATIENT
Start: 2019-10-18 | End: 2019-10-18

## 2019-10-18 RX ADMIN — GADOBUTROL 9 ML: 604.72 INJECTION INTRAVENOUS at 10:10

## 2020-08-09 ENCOUNTER — HOSPITAL ENCOUNTER (EMERGENCY)
Facility: HOSPITAL | Age: 45
Discharge: HOME | End: 2020-08-09
Attending: EMERGENCY MEDICINE
Payer: MEDICAID

## 2020-08-09 VITALS
OXYGEN SATURATION: 96 % | WEIGHT: 150 LBS | RESPIRATION RATE: 18 BRPM | SYSTOLIC BLOOD PRESSURE: 131 MMHG | TEMPERATURE: 98 F | HEIGHT: 70 IN | BODY MASS INDEX: 21.47 KG/M2 | DIASTOLIC BLOOD PRESSURE: 86 MMHG | HEART RATE: 83 BPM

## 2020-08-09 DIAGNOSIS — R07.9 CHEST PAIN: Primary | ICD-10-CM

## 2020-08-09 LAB
ALBUMIN SERPL BCP-MCNC: 2.6 G/DL (ref 3.5–5.2)
ALP SERPL-CCNC: 234 U/L (ref 55–135)
ALT SERPL W/O P-5'-P-CCNC: 51 U/L (ref 10–44)
ANION GAP SERPL CALC-SCNC: 10 MMOL/L (ref 8–16)
AST SERPL-CCNC: 78 U/L (ref 10–40)
BASOPHILS # BLD AUTO: 0.06 K/UL (ref 0–0.2)
BASOPHILS NFR BLD: 0.8 % (ref 0–1.9)
BILIRUB SERPL-MCNC: 1.9 MG/DL (ref 0.1–1)
BNP SERPL-MCNC: 31 PG/ML (ref 0–99)
BUN SERPL-MCNC: 17 MG/DL (ref 6–20)
CALCIUM SERPL-MCNC: 7.5 MG/DL (ref 8.7–10.5)
CHLORIDE SERPL-SCNC: 97 MMOL/L (ref 95–110)
CO2 SERPL-SCNC: 23 MMOL/L (ref 23–29)
CREAT SERPL-MCNC: 1.3 MG/DL (ref 0.5–1.4)
DIFFERENTIAL METHOD: ABNORMAL
EOSINOPHIL # BLD AUTO: 0 K/UL (ref 0–0.5)
EOSINOPHIL NFR BLD: 0.3 % (ref 0–8)
ERYTHROCYTE [DISTWIDTH] IN BLOOD BY AUTOMATED COUNT: 18.6 % (ref 11.5–14.5)
EST. GFR  (AFRICAN AMERICAN): >60 ML/MIN/1.73 M^2
EST. GFR  (NON AFRICAN AMERICAN): >60 ML/MIN/1.73 M^2
GLUCOSE SERPL-MCNC: 101 MG/DL (ref 70–110)
HCT VFR BLD AUTO: 33.3 % (ref 40–54)
HGB BLD-MCNC: 11.6 G/DL (ref 14–18)
IMM GRANULOCYTES # BLD AUTO: 0.03 K/UL (ref 0–0.04)
IMM GRANULOCYTES NFR BLD AUTO: 0.4 % (ref 0–0.5)
INR PPP: 1.2
LIPASE SERPL-CCNC: 46 U/L (ref 4–60)
LYMPHOCYTES # BLD AUTO: 2.3 K/UL (ref 1–4.8)
LYMPHOCYTES NFR BLD: 32 % (ref 18–48)
MCH RBC QN AUTO: 33 PG (ref 27–31)
MCHC RBC AUTO-ENTMCNC: 34.8 G/DL (ref 32–36)
MCV RBC AUTO: 95 FL (ref 82–98)
MONOCYTES # BLD AUTO: 0.5 K/UL (ref 0.3–1)
MONOCYTES NFR BLD: 7 % (ref 4–15)
NEUTROPHILS # BLD AUTO: 4.3 K/UL (ref 1.8–7.7)
NEUTROPHILS NFR BLD: 59.5 % (ref 38–73)
NRBC BLD-RTO: 0 /100 WBC
PLATELET # BLD AUTO: 262 K/UL (ref 150–350)
PMV BLD AUTO: 9.5 FL (ref 9.2–12.9)
POTASSIUM SERPL-SCNC: 4 MMOL/L (ref 3.5–5.1)
PROT SERPL-MCNC: 6 G/DL (ref 6–8.4)
PROTHROMBIN TIME: 14.6 SEC (ref 10.6–14.8)
RBC # BLD AUTO: 3.52 M/UL (ref 4.6–6.2)
SODIUM SERPL-SCNC: 130 MMOL/L (ref 136–145)
TROPONIN I SERPL DL<=0.01 NG/ML-MCNC: <0.03 NG/ML
TROPONIN I SERPL DL<=0.01 NG/ML-MCNC: <0.03 NG/ML
TSH SERPL DL<=0.005 MIU/L-ACNC: 0.84 UIU/ML (ref 0.34–5.6)
WBC # BLD AUTO: 7.16 K/UL (ref 3.9–12.7)

## 2020-08-09 PROCEDURE — 83690 ASSAY OF LIPASE: CPT

## 2020-08-09 PROCEDURE — 99285 EMERGENCY DEPT VISIT HI MDM: CPT | Mod: 25

## 2020-08-09 PROCEDURE — 84443 ASSAY THYROID STIM HORMONE: CPT

## 2020-08-09 PROCEDURE — 85610 PROTHROMBIN TIME: CPT

## 2020-08-09 PROCEDURE — 93005 ELECTROCARDIOGRAM TRACING: CPT | Performed by: INTERNAL MEDICINE

## 2020-08-09 PROCEDURE — 36415 COLL VENOUS BLD VENIPUNCTURE: CPT

## 2020-08-09 PROCEDURE — 80053 COMPREHEN METABOLIC PANEL: CPT

## 2020-08-09 PROCEDURE — 84484 ASSAY OF TROPONIN QUANT: CPT | Mod: 91

## 2020-08-09 PROCEDURE — 85025 COMPLETE CBC W/AUTO DIFF WBC: CPT

## 2020-08-09 PROCEDURE — 83880 ASSAY OF NATRIURETIC PEPTIDE: CPT

## 2020-08-09 RX ORDER — LISINOPRIL 10 MG/1
10 TABLET ORAL DAILY
COMMUNITY
End: 2022-05-04

## 2020-08-09 RX ORDER — FUROSEMIDE 20 MG/1
20 TABLET ORAL DAILY PRN
COMMUNITY
End: 2022-05-04

## 2020-08-09 RX ORDER — HYDROXYZINE HYDROCHLORIDE 25 MG/1
25 TABLET, FILM COATED ORAL 3 TIMES DAILY PRN
COMMUNITY
End: 2022-05-04

## 2020-08-09 RX ORDER — FOLIC ACID 1 MG/1
1 TABLET ORAL DAILY
COMMUNITY
End: 2022-05-04

## 2020-08-09 RX ORDER — ESCITALOPRAM OXALATE 20 MG/1
20 TABLET ORAL DAILY
COMMUNITY
End: 2022-05-04

## 2020-08-09 RX ORDER — DIAZEPAM 5 MG/1
5 TABLET ORAL 2 TIMES DAILY
COMMUNITY
End: 2022-05-04

## 2020-08-09 RX ORDER — CHOLECALCIFEROL (VITAMIN D3) 1250 MCG
1 TABLET ORAL
COMMUNITY
End: 2022-05-04

## 2020-08-09 RX ORDER — TAMSULOSIN HYDROCHLORIDE 0.4 MG/1
0.4 CAPSULE ORAL DAILY
COMMUNITY
End: 2022-05-04

## 2020-08-09 RX ORDER — DIVALPROEX SODIUM 500 MG/1
500 TABLET, FILM COATED, EXTENDED RELEASE ORAL DAILY
COMMUNITY
End: 2022-05-04

## 2020-08-09 RX ORDER — NAPROXEN SODIUM 220 MG/1
324 TABLET, FILM COATED ORAL ONCE
Status: DISCONTINUED | OUTPATIENT
Start: 2020-08-09 | End: 2020-08-09 | Stop reason: HOSPADM

## 2020-08-09 NOTE — ED PROVIDER NOTES
Encounter Date: 8/9/2020       History     Chief Complaint   Patient presents with    Chest Pain     X 2 WEEKS    Shortness of Breath     44-year-old male who has a history of alcohol abuse, hypertension, pancreatitis and a seizure disorder number was also had some diastolic heart failure, presents emergency room with a history that he has had some cramping left precordial chest pain since this morning.  The patient also states he was extremely tachycardic during that time frame.  He has not had any history of any known arrhythmias previously.  He denies any fever chills or cough.  He denies any factors that will either exacerbate or alleviate his symptoms.  The patient denies any complaints of tachycardia upon presentation to the ED.  He does continue to drink a half pt of bourbon daily.  He smokes a pack of cigarettes daily.  There has been no associated nausea vomiting.  He has no difficulty urinating.  He does states that his urine appears little darker than usual.  No history of being anemic.  No back pain.  No known history of any coronary disease.        Review of patient's allergies indicates:   Allergen Reactions    Penicillins      Past Medical History:   Diagnosis Date    ETOH abuse     Hypertension     Pancreatitis     Seizures      Past Surgical History:   Procedure Laterality Date    LEFT HEART CATHETERIZATION Left 10/7/2019    Procedure: Left heart cath;  Surgeon: Sergio Ge MD;  Location: [facility] C[facility];  Service: Cardiology;  Laterality: Left;     No family history on file.  Social History     Tobacco Use    Smoking status: Current Some Day Smoker     Packs/day: 1.00     Types: Cigarettes   Substance Use Topics    Alcohol use: Yes     Comment: DAILY    Drug use: Yes     Types: Marijuana     Comment: occassionally     Review of Systems   Constitutional: Negative for activity change, appetite change, chills, diaphoresis and fever.   HENT: Negative for congestion, ear pain,  rhinorrhea, sinus pain, sore throat and trouble swallowing.    Eyes: Negative for pain.   Respiratory: Negative for cough and shortness of breath.    Cardiovascular: Positive for chest pain and palpitations. Negative for leg swelling.   Gastrointestinal: Negative for abdominal pain, nausea and vomiting.   Genitourinary: Negative for difficulty urinating.   Musculoskeletal: Negative for arthralgias and myalgias.   Skin: Negative for pallor, rash and wound.   Neurological: Negative for dizziness, syncope, numbness and headaches.   All other systems reviewed and are negative.      Physical Exam     Initial Vitals [08/09/20 1410]   BP Pulse Resp Temp SpO2   (!) 141/93 106 20 98.4 °F (36.9 °C) 98 %      MAP       --         Physical Exam    Vitals reviewed.  Constitutional: He appears well-developed and well-nourished. He is not diaphoretic. No distress.   HENT:   Head: Normocephalic and atraumatic.   Right Ear: External ear normal.   Left Ear: External ear normal.   Nose: Nose normal.   Mouth/Throat: Oropharynx is clear and moist.   Eyes: Conjunctivae and EOM are normal. Pupils are equal, round, and reactive to light. Right eye exhibits no discharge. Left eye exhibits no discharge.   Neck: Normal range of motion. Neck supple. No JVD present.   Cardiovascular: Normal rate, regular rhythm, normal heart sounds and intact distal pulses. Exam reveals no gallop and no friction rub.    No murmur heard.  Pulmonary/Chest: Breath sounds normal. No respiratory distress. He has no wheezes. He has no rhonchi. He has no rales. He exhibits no tenderness.   Abdominal: Soft. Bowel sounds are normal. He exhibits no distension. There is abdominal tenderness. There is no rebound and no guarding.   Tenderness to direct palpation in the epigastrium and right upper quadrant   Musculoskeletal: Normal range of motion. No tenderness or edema.   Lymphadenopathy:     He has no cervical adenopathy.   Neurological: He is alert and oriented to  person, place, and time. He has normal strength and normal reflexes. No cranial nerve deficit or sensory deficit. GCS score is 15. GCS eye subscore is 4. GCS verbal subscore is 5. GCS motor subscore is 6.   Skin: Skin is warm and dry. Capillary refill takes less than 2 seconds. No rash noted. No erythema. No pallor.   Psychiatric: He has a normal mood and affect. His behavior is normal. Judgment and thought content normal.         ED Course   Procedures  Labs Reviewed   CBC W/ AUTO DIFFERENTIAL   COMPREHENSIVE METABOLIC PANEL   B-TYPE NATRIURETIC PEPTIDE   TROPONIN I   PROTIME-INR          Imaging Results          X-Ray Chest AP Portable (In process)                               Attending Attestation:             Attending ED Notes:   This 44-year-old male who has a history of alcoholism, pancreatitis, hypertension and diastolic heart failure had presented with some complaints of left precordial pain he describes as cramping, which is atypical in nature.  There were no alleviating or exacerbating factors.  He also noted today he had some tachycardia that had since resolved.  At this time his EKG did not show any acute abnormalities.  The ventricular rate is 112.  There is no evidence of any ischemia ectopy.  Intervals are normal.  Axis is normal.  The remainder of the labs showed a normal troponin as well as a normal lipase at this time chemistries did show evidence of elevated LFTs consistent with an alcoholic liver disease.  The patient has no elevation of his BNP and chest x-ray did not show evidence of any failure.  Throughout the ED visit he had no further complaints of chest pain.  He will have a 2nd troponin drawn at 5:20 p.m. if this proves negative also he will be able to be discharged to sees cardiologist on Tuesday which is 2 days from now as planned.    The patient's 2nd troponin was also negative.  He with therefore be discharged to follow with his primary physician.                            Clinical  Impression:       ICD-10-CM ICD-9-CM   1. Chest pain  R07.9 786.50                                Luis Angel Espino Jr., MD  08/11/20 0628

## 2022-05-04 ENCOUNTER — HOSPITAL ENCOUNTER (OUTPATIENT)
Facility: HOSPITAL | Age: 47
Discharge: HOME | End: 2022-05-05
Attending: EMERGENCY MEDICINE | Admitting: INTERNAL MEDICINE
Payer: MEDICAID

## 2022-05-04 DIAGNOSIS — N17.9 AKI (ACUTE KIDNEY INJURY): ICD-10-CM

## 2022-05-04 DIAGNOSIS — R07.9 CHEST PAIN: ICD-10-CM

## 2022-05-04 DIAGNOSIS — I95.9 HYPOTENSION, UNSPECIFIED HYPOTENSION TYPE: Primary | ICD-10-CM

## 2022-05-04 DIAGNOSIS — T67.01XA HEAT STROKE, INITIAL ENCOUNTER: ICD-10-CM

## 2022-05-04 PROBLEM — M62.82 RHABDOMYOLYSIS: Status: ACTIVE | Noted: 2022-05-04

## 2022-05-04 PROBLEM — F19.10 POLYSUBSTANCE ABUSE: Status: ACTIVE | Noted: 2022-05-04

## 2022-05-04 LAB
ALBUMIN SERPL BCP-MCNC: 4 G/DL (ref 3.5–5.2)
ALP SERPL-CCNC: 47 U/L (ref 55–135)
ALT SERPL W/O P-5'-P-CCNC: 17 U/L (ref 10–44)
AMPHET+METHAMPHET UR QL: NEGATIVE
ANION GAP SERPL CALC-SCNC: 13 MMOL/L (ref 8–16)
AST SERPL-CCNC: 22 U/L (ref 10–40)
BARBITURATES UR QL SCN>200 NG/ML: NEGATIVE
BASOPHILS # BLD AUTO: 0.07 K/UL (ref 0–0.2)
BASOPHILS NFR BLD: 0.9 % (ref 0–1.9)
BENZODIAZ UR QL SCN>200 NG/ML: NEGATIVE
BILIRUB SERPL-MCNC: 0.4 MG/DL (ref 0.1–1)
BNP SERPL-MCNC: 15 PG/ML (ref 0–99)
BUN SERPL-MCNC: 54 MG/DL (ref 6–20)
BZE UR QL SCN: NEGATIVE
CALCIUM SERPL-MCNC: 8.9 MG/DL (ref 8.7–10.5)
CANNABINOIDS UR QL SCN: ABNORMAL
CHLORIDE SERPL-SCNC: 100 MMOL/L (ref 95–110)
CK SERPL-CCNC: 333 U/L (ref 20–200)
CO2 SERPL-SCNC: 23 MMOL/L (ref 23–29)
CREAT SERPL-MCNC: 3 MG/DL (ref 0.5–1.4)
CREAT UR-MCNC: 73 MG/DL (ref 23–375)
DIFFERENTIAL METHOD: ABNORMAL
EOSINOPHIL # BLD AUTO: 0.4 K/UL (ref 0–0.5)
EOSINOPHIL NFR BLD: 5 % (ref 0–8)
ERYTHROCYTE [DISTWIDTH] IN BLOOD BY AUTOMATED COUNT: 16.5 % (ref 11.5–14.5)
ERYTHROCYTE [DISTWIDTH] IN BLOOD BY AUTOMATED COUNT: 16.6 % (ref 11.5–14.5)
EST. GFR  (AFRICAN AMERICAN): 27.3 ML/MIN/1.73 M^2
EST. GFR  (NON AFRICAN AMERICAN): 23.6 ML/MIN/1.73 M^2
ETHANOL SERPL-MCNC: <5 MG/DL
GLUCOSE SERPL-MCNC: 109 MG/DL (ref 70–110)
HCT VFR BLD AUTO: 30.4 % (ref 40–54)
HCT VFR BLD AUTO: 30.9 % (ref 40–54)
HGB BLD-MCNC: 9.4 G/DL (ref 14–18)
HGB BLD-MCNC: 9.6 G/DL (ref 14–18)
IMM GRANULOCYTES # BLD AUTO: 0.02 K/UL (ref 0–0.04)
IMM GRANULOCYTES NFR BLD AUTO: 0.2 % (ref 0–0.5)
INR PPP: 1
LACTATE SERPL-SCNC: 1.1 MMOL/L (ref 0.5–1.9)
LYMPHOCYTES # BLD AUTO: 3.5 K/UL (ref 1–4.8)
LYMPHOCYTES NFR BLD: 42.2 % (ref 18–48)
MAGNESIUM SERPL-MCNC: 2.2 MG/DL (ref 1.6–2.6)
MCH RBC QN AUTO: 27.1 PG (ref 27–31)
MCH RBC QN AUTO: 27.2 PG (ref 27–31)
MCHC RBC AUTO-ENTMCNC: 30.9 G/DL (ref 32–36)
MCHC RBC AUTO-ENTMCNC: 31.1 G/DL (ref 32–36)
MCV RBC AUTO: 87 FL (ref 82–98)
MCV RBC AUTO: 88 FL (ref 82–98)
MONOCYTES # BLD AUTO: 0.7 K/UL (ref 0.3–1)
MONOCYTES NFR BLD: 8.2 % (ref 4–15)
NEUTROPHILS # BLD AUTO: 3.6 K/UL (ref 1.8–7.7)
NEUTROPHILS NFR BLD: 43.5 % (ref 38–73)
NRBC BLD-RTO: 0 /100 WBC
OPIATES UR QL SCN: NEGATIVE
PCP UR QL SCN>25 NG/ML: NEGATIVE
PLATELET # BLD AUTO: 460 K/UL (ref 150–450)
PLATELET # BLD AUTO: 506 K/UL (ref 150–450)
PMV BLD AUTO: 8.6 FL (ref 9.2–12.9)
PMV BLD AUTO: 9.1 FL (ref 9.2–12.9)
POTASSIUM SERPL-SCNC: 4.4 MMOL/L (ref 3.5–5.1)
PROT SERPL-MCNC: 6.7 G/DL (ref 6–8.4)
PROTHROMBIN TIME: 12.8 SEC (ref 11.4–13.7)
RBC # BLD AUTO: 3.45 M/UL (ref 4.6–6.2)
RBC # BLD AUTO: 3.54 M/UL (ref 4.6–6.2)
SARS-COV-2 RDRP RESP QL NAA+PROBE: NEGATIVE
SODIUM SERPL-SCNC: 136 MMOL/L (ref 136–145)
TOXICOLOGY INFORMATION: ABNORMAL
TROPONIN I SERPL DL<=0.01 NG/ML-MCNC: <0.03 NG/ML
TROPONIN I SERPL DL<=0.01 NG/ML-MCNC: <0.03 NG/ML
TSH SERPL DL<=0.005 MIU/L-ACNC: 0.67 UIU/ML (ref 0.34–5.6)
VALPROATE SERPL-MCNC: 30.5 UG/ML (ref 50–100)
WBC # BLD AUTO: 7.29 K/UL (ref 3.9–12.7)
WBC # BLD AUTO: 8.19 K/UL (ref 3.9–12.7)

## 2022-05-04 PROCEDURE — 63600175 PHARM REV CODE 636 W HCPCS: Performed by: EMERGENCY MEDICINE

## 2022-05-04 PROCEDURE — 96374 THER/PROPH/DIAG INJ IV PUSH: CPT

## 2022-05-04 PROCEDURE — 83735 ASSAY OF MAGNESIUM: CPT | Performed by: EMERGENCY MEDICINE

## 2022-05-04 PROCEDURE — 25000003 PHARM REV CODE 250: Performed by: EMERGENCY MEDICINE

## 2022-05-04 PROCEDURE — 83880 ASSAY OF NATRIURETIC PEPTIDE: CPT | Performed by: NURSE PRACTITIONER

## 2022-05-04 PROCEDURE — 85027 COMPLETE CBC AUTOMATED: CPT | Performed by: INTERNAL MEDICINE

## 2022-05-04 PROCEDURE — G0378 HOSPITAL OBSERVATION PER HR: HCPCS

## 2022-05-04 PROCEDURE — 99900035 HC TECH TIME PER 15 MIN (STAT)

## 2022-05-04 PROCEDURE — 27000221 HC OXYGEN, UP TO 24 HOURS

## 2022-05-04 PROCEDURE — 36415 COLL VENOUS BLD VENIPUNCTURE: CPT | Performed by: INTERNAL MEDICINE

## 2022-05-04 PROCEDURE — 93010 ELECTROCARDIOGRAM REPORT: CPT | Mod: ,,, | Performed by: INTERNAL MEDICINE

## 2022-05-04 PROCEDURE — 25000003 PHARM REV CODE 250: Performed by: INTERNAL MEDICINE

## 2022-05-04 PROCEDURE — 82077 ASSAY SPEC XCP UR&BREATH IA: CPT | Performed by: EMERGENCY MEDICINE

## 2022-05-04 PROCEDURE — 80164 ASSAY DIPROPYLACETIC ACD TOT: CPT | Performed by: INTERNAL MEDICINE

## 2022-05-04 PROCEDURE — 80307 DRUG TEST PRSMV CHEM ANLYZR: CPT | Performed by: EMERGENCY MEDICINE

## 2022-05-04 PROCEDURE — 85025 COMPLETE CBC W/AUTO DIFF WBC: CPT | Performed by: NURSE PRACTITIONER

## 2022-05-04 PROCEDURE — U0002 COVID-19 LAB TEST NON-CDC: HCPCS | Performed by: EMERGENCY MEDICINE

## 2022-05-04 PROCEDURE — 96361 HYDRATE IV INFUSION ADD-ON: CPT

## 2022-05-04 PROCEDURE — 93005 ELECTROCARDIOGRAM TRACING: CPT | Performed by: INTERNAL MEDICINE

## 2022-05-04 PROCEDURE — 80053 COMPREHEN METABOLIC PANEL: CPT | Performed by: NURSE PRACTITIONER

## 2022-05-04 PROCEDURE — 94799 UNLISTED PULMONARY SVC/PX: CPT

## 2022-05-04 PROCEDURE — 83605 ASSAY OF LACTIC ACID: CPT | Performed by: EMERGENCY MEDICINE

## 2022-05-04 PROCEDURE — 87040 BLOOD CULTURE FOR BACTERIA: CPT | Performed by: EMERGENCY MEDICINE

## 2022-05-04 PROCEDURE — 25000003 PHARM REV CODE 250: Performed by: NURSE PRACTITIONER

## 2022-05-04 PROCEDURE — 82550 ASSAY OF CK (CPK): CPT | Performed by: EMERGENCY MEDICINE

## 2022-05-04 PROCEDURE — 85610 PROTHROMBIN TIME: CPT | Performed by: NURSE PRACTITIONER

## 2022-05-04 PROCEDURE — 93010 EKG 12-LEAD: ICD-10-PCS | Mod: ,,, | Performed by: INTERNAL MEDICINE

## 2022-05-04 PROCEDURE — 99285 EMERGENCY DEPT VISIT HI MDM: CPT | Mod: 25

## 2022-05-04 PROCEDURE — 84443 ASSAY THYROID STIM HORMONE: CPT | Performed by: EMERGENCY MEDICINE

## 2022-05-04 PROCEDURE — 84484 ASSAY OF TROPONIN QUANT: CPT | Mod: 91 | Performed by: INTERNAL MEDICINE

## 2022-05-04 PROCEDURE — 94761 N-INVAS EAR/PLS OXIMETRY MLT: CPT

## 2022-05-04 PROCEDURE — 84484 ASSAY OF TROPONIN QUANT: CPT | Performed by: NURSE PRACTITIONER

## 2022-05-04 RX ORDER — PRAZOSIN HYDROCHLORIDE 2 MG/1
2 CAPSULE ORAL NIGHTLY
COMMUNITY
Start: 2022-02-14 | End: 2022-05-04

## 2022-05-04 RX ORDER — SODIUM CHLORIDE 9 MG/ML
1000 INJECTION, SOLUTION INTRAVENOUS
Status: DISCONTINUED | OUTPATIENT
Start: 2022-05-04 | End: 2022-05-04

## 2022-05-04 RX ORDER — ESCITALOPRAM OXALATE 10 MG/1
10 TABLET ORAL EVERY MORNING
COMMUNITY
Start: 2022-02-14 | End: 2022-11-22

## 2022-05-04 RX ORDER — IBUPROFEN 200 MG
24 TABLET ORAL
Status: DISCONTINUED | OUTPATIENT
Start: 2022-05-04 | End: 2022-05-05 | Stop reason: HOSPADM

## 2022-05-04 RX ORDER — BUPRENORPHINE HYDROCHLORIDE, NALOXONE HYDROCHLORIDE 8; 2 MG/1; MG/1
1 FILM, SOLUBLE BUCCAL; SUBLINGUAL 2 TIMES DAILY
COMMUNITY
Start: 2022-02-24

## 2022-05-04 RX ORDER — NALOXONE HYDROCHLORIDE 4 MG/.1ML
SPRAY NASAL
COMMUNITY
Start: 2022-02-02 | End: 2022-05-04

## 2022-05-04 RX ORDER — LEVOMEFOLATE CALCIUM 15 MG
1 TABLET ORAL EVERY MORNING
COMMUNITY
Start: 2022-02-02 | End: 2022-05-04

## 2022-05-04 RX ORDER — BUPRENORPHINE 2 MG/1
1 TABLET SUBLINGUAL 2 TIMES DAILY
COMMUNITY
Start: 2022-02-02 | End: 2022-05-04

## 2022-05-04 RX ORDER — HYDROXYZINE PAMOATE 50 MG/1
50 CAPSULE ORAL EVERY 8 HOURS
COMMUNITY
Start: 2022-02-02 | End: 2022-05-04

## 2022-05-04 RX ORDER — ESCITALOPRAM OXALATE 10 MG/1
10 TABLET ORAL EVERY MORNING
Status: DISCONTINUED | OUTPATIENT
Start: 2022-05-05 | End: 2022-05-05 | Stop reason: HOSPADM

## 2022-05-04 RX ORDER — NALOXONE HCL 0.4 MG/ML
0.02 VIAL (ML) INJECTION
Status: DISCONTINUED | OUTPATIENT
Start: 2022-05-04 | End: 2022-05-05 | Stop reason: HOSPADM

## 2022-05-04 RX ORDER — SODIUM CHLORIDE 0.9 % (FLUSH) 0.9 %
10 SYRINGE (ML) INJECTION EVERY 12 HOURS PRN
Status: DISCONTINUED | OUTPATIENT
Start: 2022-05-04 | End: 2022-05-05 | Stop reason: HOSPADM

## 2022-05-04 RX ORDER — MIRTAZAPINE 15 MG/1
15 TABLET, FILM COATED ORAL NIGHTLY
COMMUNITY
Start: 2022-02-14 | End: 2022-05-04

## 2022-05-04 RX ORDER — DIVALPROEX SODIUM 250 MG/1
250 TABLET, FILM COATED, EXTENDED RELEASE ORAL NIGHTLY
COMMUNITY
Start: 2022-02-14 | End: 2022-11-22 | Stop reason: DRUGHIGH

## 2022-05-04 RX ORDER — SODIUM CHLORIDE 9 MG/ML
INJECTION, SOLUTION INTRAVENOUS CONTINUOUS
Status: DISCONTINUED | OUTPATIENT
Start: 2022-05-04 | End: 2022-05-05 | Stop reason: HOSPADM

## 2022-05-04 RX ORDER — ACAMPROSATE CALCIUM 333 MG/1
666 TABLET, DELAYED RELEASE ORAL 3 TIMES DAILY
COMMUNITY
Start: 2022-02-14

## 2022-05-04 RX ORDER — QUETIAPINE FUMARATE 50 MG/1
50 TABLET, FILM COATED ORAL NIGHTLY
COMMUNITY
Start: 2022-02-03 | End: 2022-05-04

## 2022-05-04 RX ORDER — IBUPROFEN 200 MG
16 TABLET ORAL
Status: DISCONTINUED | OUTPATIENT
Start: 2022-05-04 | End: 2022-05-05 | Stop reason: HOSPADM

## 2022-05-04 RX ORDER — CETIRIZINE HYDROCHLORIDE 10 MG/1
10 TABLET ORAL EVERY MORNING
COMMUNITY
Start: 2022-02-03 | End: 2022-05-04

## 2022-05-04 RX ORDER — BUPRENORPHINE AND NALOXONE 8; 2 MG/1; MG/1
1 FILM, SOLUBLE BUCCAL; SUBLINGUAL 2 TIMES DAILY
Status: DISCONTINUED | OUTPATIENT
Start: 2022-05-04 | End: 2022-05-05

## 2022-05-04 RX ORDER — DIVALPROEX SODIUM 250 MG/1
250 TABLET, FILM COATED, EXTENDED RELEASE ORAL NIGHTLY
Status: DISCONTINUED | OUTPATIENT
Start: 2022-05-04 | End: 2022-05-05 | Stop reason: HOSPADM

## 2022-05-04 RX ORDER — OMEPRAZOLE 40 MG/1
40 CAPSULE, DELAYED RELEASE ORAL EVERY MORNING
COMMUNITY
Start: 2022-02-02 | End: 2022-05-04

## 2022-05-04 RX ORDER — PHENOBARBITAL 32.4 MG/1
TABLET ORAL
COMMUNITY
Start: 2022-02-02 | End: 2022-05-04

## 2022-05-04 RX ORDER — GLUCAGON 1 MG
1 KIT INJECTION
Status: DISCONTINUED | OUTPATIENT
Start: 2022-05-04 | End: 2022-05-05 | Stop reason: HOSPADM

## 2022-05-04 RX ORDER — BUSPIRONE HYDROCHLORIDE 15 MG/1
15 TABLET ORAL 2 TIMES DAILY
COMMUNITY
Start: 2022-02-02 | End: 2022-05-04

## 2022-05-04 RX ORDER — TIZANIDINE 4 MG/1
4 TABLET ORAL 3 TIMES DAILY
COMMUNITY
Start: 2022-02-02 | End: 2022-05-04

## 2022-05-04 RX ORDER — NALOXONE HCL 0.4 MG/ML
0.4 VIAL (ML) INJECTION
Status: COMPLETED | OUTPATIENT
Start: 2022-05-04 | End: 2022-05-04

## 2022-05-04 RX ADMIN — NALOXONE HYDROCHLORIDE 0.4 MG: 0.4 INJECTION, SOLUTION INTRAMUSCULAR; INTRAVENOUS; SUBCUTANEOUS at 05:05

## 2022-05-04 RX ADMIN — SODIUM CHLORIDE 1000 ML: 9 INJECTION, SOLUTION INTRAVENOUS at 05:05

## 2022-05-04 RX ADMIN — DIVALPROEX SODIUM 250 MG: 250 TABLET, FILM COATED, EXTENDED RELEASE ORAL at 10:05

## 2022-05-04 RX ADMIN — SODIUM CHLORIDE: 0.9 INJECTION, SOLUTION INTRAVENOUS at 09:05

## 2022-05-04 NOTE — ED PROVIDER NOTES
Encounter Date: 5/4/2022       History     Chief Complaint   Patient presents with    LOW BP     TODAY 80/40s     GEN WEAKNESS    Leg Pain     BILAT AT GROINS     46-year-old male presents with hypotension, patient reports that he is a  and has been working in the heat for several hours patient reports that for several days he has been lightheaded on standing patient reports he feels fatigued and weak patient has a past medical history of hypertension, pancreatitis, alcohol abuse, seizures.  Patient reports body cramps and discomfort.  Patient also complaining of chest pain patient describes substernal pain rated as 4/10 and described as uncomfortable beneath his breast bone patient reports this pain is intermittent patient reports currently no chest pain.        Review of patient's allergies indicates:   Allergen Reactions    Penicillins      Past Medical History:   Diagnosis Date    ETOH abuse     Hypertension     Pancreatitis     Seizures      Past Surgical History:   Procedure Laterality Date    LEFT HEART CATHETERIZATION Left 10/07/2019    Procedure: Left heart cath;  Surgeon: Sergio Ge MD;  Location: [facility] CATH LAB;  Service: Cardiology;  Laterality: Left;     Family History   Problem Relation Age of Onset    No Known Problems Mother      Social History     Tobacco Use    Smoking status: Current Some Day Smoker     Packs/day: 1.00     Types: Cigarettes   Substance Use Topics    Alcohol use: Yes     Comment: DAILY    Drug use: Yes     Types: Marijuana     Comment: occassionally     Review of Systems   Constitutional: Positive for fatigue. Negative for fever.   HENT: Negative for congestion, rhinorrhea, sore throat and trouble swallowing.    Eyes: Negative for visual disturbance.   Respiratory: Negative for cough, chest tightness, shortness of breath and wheezing.    Cardiovascular: Positive for chest pain. Negative for palpitations and leg swelling.   Gastrointestinal:  Negative for abdominal distention, abdominal pain, constipation, diarrhea, nausea and vomiting.   Genitourinary: Negative for difficulty urinating, dysuria, flank pain and frequency.   Musculoskeletal: Negative for arthralgias, back pain, joint swelling and neck pain.   Skin: Negative for color change and rash.   Neurological: Positive for light-headedness. Negative for dizziness, syncope, speech difficulty, weakness, numbness and headaches.   All other systems reviewed and are negative.      Physical Exam     Initial Vitals [05/04/22 1422]   BP Pulse Resp Temp SpO2   (!) 72/51 66 20 98.3 °F (36.8 °C) 95 %      MAP       --         Physical Exam    Nursing note and vitals reviewed.  Constitutional: He appears well-developed and well-nourished. He is not diaphoretic. No distress.   HENT:   Head: Normocephalic and atraumatic.   Right Ear: External ear normal.   Left Ear: External ear normal.   Nose: Nose normal.   Mouth/Throat: Oropharynx is clear and moist. No oropharyngeal exudate.   Eyes: Conjunctivae and EOM are normal. Pupils are equal, round, and reactive to light. Right eye exhibits no discharge. Left eye exhibits no discharge. No scleral icterus.   Neck: Neck supple. No thyromegaly present. No tracheal deviation present. No JVD present.   Normal range of motion.  Cardiovascular: Normal rate, regular rhythm, normal heart sounds and intact distal pulses. Exam reveals no gallop and no friction rub.    No murmur heard.  Pulmonary/Chest: Breath sounds normal. No stridor. No respiratory distress. He has no wheezes. He has no rhonchi. He has no rales. He exhibits no tenderness.   Abdominal: Abdomen is soft. Bowel sounds are normal. He exhibits no distension and no mass. There is no abdominal tenderness. There is no rebound and no guarding.   Musculoskeletal:         General: No tenderness or edema. Normal range of motion.      Cervical back: Normal range of motion and neck supple.     Lymphadenopathy:     He has no  cervical adenopathy.   Neurological: He is alert and oriented to person, place, and time. He has normal strength. No cranial nerve deficit or sensory deficit.   Skin: Skin is warm and dry. No rash noted. No erythema.         ED Course   Procedures  Labs Reviewed   CBC W/ AUTO DIFFERENTIAL - Abnormal; Notable for the following components:       Result Value    RBC 3.45 (*)     Hemoglobin 9.4 (*)     Hematocrit 30.4 (*)     MCHC 30.9 (*)     RDW 16.5 (*)     Platelets 506 (*)     MPV 9.1 (*)     All other components within normal limits   COMPREHENSIVE METABOLIC PANEL - Abnormal; Notable for the following components:    BUN 54 (*)     Creatinine 3.0 (*)     Alkaline Phosphatase 47 (*)     eGFR if  27.3 (*)     eGFR if non  23.6 (*)     All other components within normal limits   DRUG SCREEN PANEL, URINE EMERGENCY - Abnormal; Notable for the following components:    THC Presumptive Positive (*)     All other components within normal limits    Narrative:     Specimen Source->Urine   CK - Abnormal; Notable for the following components:     (*)     All other components within normal limits   CULTURE, BLOOD   CULTURE, BLOOD   B-TYPE NATRIURETIC PEPTIDE   TROPONIN I   PROTIME-INR   LACTIC ACID, PLASMA   ALCOHOL,MEDICAL (ETHANOL)   MAGNESIUM   TSH   SARS-COV-2 RNA AMPLIFICATION, QUAL        ECG Results          EKG 12-lead (In process)  Result time 05/04/22 15:06:57    In process by Interface, Lab In [facility] (05/04/22 15:06:57)                 Narrative:    Test Reason : R07.9,    Vent. Rate : 059 BPM     Atrial Rate : 059 BPM     P-R Int : 160 ms          QRS Dur : 092 ms      QT Int : 434 ms       P-R-T Axes : 035 050 046 degrees     QTc Int : 429 ms    Sinus bradycardia  Otherwise normal ECG  When compared with ECG of 09-AUG-2020 14:04,  Vent. rate has decreased BY  53 BPM    Referred By: [email or web address]   SELF           Confirmed By:                             Imaging Results          CT  Head Without Contrast (Final result)  Result time 05/04/22 19:10:48    Final result by Keyshawn Rodriguez MD (05/04/22 19:10:48)                 Narrative:    CMS MANDATED QUALITY DATA - CT RADIATION - 436    All CT scans at this facility utilize dose modulation, iterative reconstruction, and/or weight based dosing when appropriate to reduce radiation dose to as low as reasonably achievable.          Reason: Dizziness, persistent/recurrent, cardiac or vascular cause suspected    TECHNIQUE: Head CT without IV contrast.    COMPARISON: MRI 10/18/2019    FINDINGS:  Gray-white differentiation is maintained without hemorrhage, midline shift, or mass effect.    The ventricles and cisterns are maintained.    Calvarium is intact. Visualized sinuses are clear.    IMPRESSION:  Normal noncontrast head CT.    Electronically signed by:  Keyshawn Rodriguez MD  5/4/2022 7:10 PM CDT Workstation: [id number]                             X-Ray Chest AP Portable (Final result)  Result time 05/04/22 14:59:09    Final result by Harris Granados MD (05/04/22 14:59:09)                 Narrative:    HISTORY: Chest pain and weakness.    FINDINGS: Portable chest radiograph at 1445 hours compared to 08/09/2020 shows the cardiomediastinal silhouette and pulmonary vasculature are within normal limits.    The lungs are normally expanded, with no consolidation, large pleural effusion, or evidence of pulmonary edema. No confluent infiltrates or pneumothorax. There are no significant osseous abnormalities.    IMPRESSION: No evidence of active cardiopulmonary disease.    Electronically signed by:  Harris Granados MD  5/4/2022 2:59 PM CDT Workstation: [id number]                               Medications   0.9%  NaCl infusion (1,000 mLs Intravenous Not Given 5/4/22 1915)   sodium chloride 0.9% bolus 1,000 mL (0 mLs Intravenous Stopped 5/4/22 1820)   sodium chloride 0.9% bolus 1,000 mL (0 mLs Intravenous Stopped 5/4/22 1820)   naloxone 0.4 mg/mL injection 0.4 mg (0.4  mg Intravenous Given 5/4/22 7500)   sodium chloride 0.9% bolus 1,000 mL (0 mLs Intravenous Stopped 5/4/22 2125)     Medical Decision Making:   History:   Old Medical Records: I decided to obtain old medical records.  Initial Assessment:   Emergent evaluation of a 46-year-old male presenting with fatigue and low blood pressure differential diagnosis includes hypotension, infection, ACS, electrolyte abnormality, endocrine dysfunction, rhabdo, dehydration, intoxication            Attending Attestation:         Attending Critical Care:   Critical Care Times:   Direct Patient Care (initial evaluation, reassessments, and time considering the case)................................................................20 minutes.   Additional History from reviewing old medical records or taking additional history from the family, EMS, PCP, etc.......................5 minutes.   Ordering, Reviewing, and Interpreting Diagnostic Studies...............................................................................................................5 minutes.   Documentation..................................................................................................................................................................................10 minutes.   Consultation with other Physicians. .................................................................................................................................................10 minutes.   ==============================================================  · Total Critical Care Time - exclusive of procedural time: 50 minutes.  ==============================================================  Critical care was necessary to treat or prevent imminent or life-threatening deterioration of the following conditions: hypotension.   Critical care was time spent personally by me on the following activities: obtaining history from patient or relative, examination of patient, review of old  charts, ordering lab, x-rays, and/or EKG, development of treatment plan with patient or relative, evaluation of patient's response to treatment, discussion with consultants and re-evaluation of patient's conition.   Critical Care Condition: potentially life-threatening       Attending ED Notes:   Blood pressure has improved with IV hydration, patient consulted for admission to Internal Medicine for further evaluation and management               Clinical Impression:   Final diagnoses:  [R07.9] Chest pain  [I95.9] Hypotension, unspecified hypotension type (Primary)          ED Disposition Condition    Observation               Rogelio Ly MD  05/04/22 4369

## 2022-05-05 VITALS
SYSTOLIC BLOOD PRESSURE: 127 MMHG | HEIGHT: 70 IN | BODY MASS INDEX: 24.18 KG/M2 | TEMPERATURE: 98 F | RESPIRATION RATE: 18 BRPM | DIASTOLIC BLOOD PRESSURE: 73 MMHG | WEIGHT: 168.88 LBS | HEART RATE: 54 BPM | OXYGEN SATURATION: 96 %

## 2022-05-05 LAB
ANION GAP SERPL CALC-SCNC: 8 MMOL/L (ref 8–16)
BUN SERPL-MCNC: 41 MG/DL (ref 6–20)
CALCIUM SERPL-MCNC: 7.9 MG/DL (ref 8.7–10.5)
CHLORIDE SERPL-SCNC: 109 MMOL/L (ref 95–110)
CK SERPL-CCNC: 251 U/L (ref 20–200)
CO2 SERPL-SCNC: 24 MMOL/L (ref 23–29)
CREAT SERPL-MCNC: 1.6 MG/DL (ref 0.5–1.4)
EST. GFR  (AFRICAN AMERICAN): 58.4 ML/MIN/1.73 M^2
EST. GFR  (NON AFRICAN AMERICAN): 50.6 ML/MIN/1.73 M^2
GLUCOSE SERPL-MCNC: 116 MG/DL (ref 70–110)
POTASSIUM SERPL-SCNC: 3.7 MMOL/L (ref 3.5–5.1)
SODIUM SERPL-SCNC: 141 MMOL/L (ref 136–145)
TROPONIN I SERPL DL<=0.01 NG/ML-MCNC: <0.03 NG/ML

## 2022-05-05 PROCEDURE — 25000003 PHARM REV CODE 250

## 2022-05-05 PROCEDURE — 80048 BASIC METABOLIC PNL TOTAL CA: CPT | Performed by: INTERNAL MEDICINE

## 2022-05-05 PROCEDURE — 84484 ASSAY OF TROPONIN QUANT: CPT | Performed by: INTERNAL MEDICINE

## 2022-05-05 PROCEDURE — 94761 N-INVAS EAR/PLS OXIMETRY MLT: CPT

## 2022-05-05 PROCEDURE — 63600175 PHARM REV CODE 636 W HCPCS: Performed by: STUDENT IN AN ORGANIZED HEALTH CARE EDUCATION/TRAINING PROGRAM

## 2022-05-05 PROCEDURE — 94799 UNLISTED PULMONARY SVC/PX: CPT

## 2022-05-05 PROCEDURE — 99900031 HC PATIENT EDUCATION (STAT)

## 2022-05-05 PROCEDURE — G0378 HOSPITAL OBSERVATION PER HR: HCPCS

## 2022-05-05 PROCEDURE — 27000221 HC OXYGEN, UP TO 24 HOURS

## 2022-05-05 PROCEDURE — 99900035 HC TECH TIME PER 15 MIN (STAT)

## 2022-05-05 PROCEDURE — 36415 COLL VENOUS BLD VENIPUNCTURE: CPT | Performed by: INTERNAL MEDICINE

## 2022-05-05 PROCEDURE — 25000003 PHARM REV CODE 250: Performed by: INTERNAL MEDICINE

## 2022-05-05 PROCEDURE — 82550 ASSAY OF CK (CPK): CPT | Performed by: INTERNAL MEDICINE

## 2022-05-05 PROCEDURE — 96361 HYDRATE IV INFUSION ADD-ON: CPT

## 2022-05-05 RX ORDER — CHLORHEXIDINE GLUCONATE ORAL RINSE 1.2 MG/ML
15 SOLUTION DENTAL 2 TIMES DAILY
Status: DISCONTINUED | OUTPATIENT
Start: 2022-05-05 | End: 2022-05-05 | Stop reason: HOSPADM

## 2022-05-05 RX ORDER — MUPIROCIN 20 MG/G
OINTMENT TOPICAL 2 TIMES DAILY
Status: DISCONTINUED | OUTPATIENT
Start: 2022-05-05 | End: 2022-05-05 | Stop reason: HOSPADM

## 2022-05-05 RX ADMIN — ESCITALOPRAM OXALATE 10 MG: 10 TABLET ORAL at 09:05

## 2022-05-05 RX ADMIN — SODIUM CHLORIDE, SODIUM LACTATE, POTASSIUM CHLORIDE, AND CALCIUM CHLORIDE 1000 ML: .6; .31; .03; .02 INJECTION, SOLUTION INTRAVENOUS at 09:05

## 2022-05-05 RX ADMIN — SODIUM CHLORIDE: 0.9 INJECTION, SOLUTION INTRAVENOUS at 04:05

## 2022-05-05 RX ADMIN — MUPIROCIN: 20 OINTMENT TOPICAL at 09:05

## 2022-05-05 RX ADMIN — CHLORHEXIDINE GLUCONATE 15 ML: 1.2 RINSE ORAL at 09:05

## 2022-05-05 NOTE — CARE UPDATE
05/04/22 2231   Patient Assessment/Suction   Respiratory Effort Unlabored   Rhythm/Pattern, Respiratory pattern regular   PRE-TX-O2   O2 Device (Oxygen Therapy) nasal cannula   $ Is the patient on Low Flow Oxygen? Yes   Flow (L/min) 1   SpO2 (!) 92 %   Pulse Oximetry Type Continuous   $ Pulse Oximetry - Multiple Charge Pulse Oximetry - Multiple   Pulse 69   Resp 20   Respiratory Evaluation   $ Care Plan Tech Time 15 min   $ Eval/Re-eval Charges Evaluation   Evaluation For New Orders

## 2022-05-05 NOTE — NURSING
Attempted to call ED x3 no answer and phone picked up and then hung up called back no answer. Will continue to call.     2052 called ED no answer will continue to call    2106 report received

## 2022-05-05 NOTE — HPI
46-year-old male  with PMH of HTN , CHF ?? , on saboxone , ETOH abuse , marijuana, seizure disorder   presents with hypotension.  Patient reports that he is a  and has been working in the heat for several hours for several days recently .   he has been having  lightheaded on standing as well and feels fatigued and weak .  Patient report chest area tenderness to the ER MD but did not complaint about it to me .  IV given and BP improved but low side he is drowsy but answer questions well . Father bedside . He took HCTZ this am .Moving all limbs and no focal weakness. CPK elevated

## 2022-05-05 NOTE — PLAN OF CARE
[facility]  Initial Discharge Assessment       Primary Care Provider: [facility]    Admission Diagnosis: Hypotension, unspecified hypotension type [I95.9]    Admission Date: 5/4/2022  Expected Discharge Date: 5/5/2022    Discharge Barriers Identified: Substance Abuse     Assessment completed at bedside.  Advanced directives not addressed at this time.  Patient intends to discharge home where he lives with his father.  Patient requesting a PCP, cm sent information to Cinda in hopes she can assign.  No other needs identified at this time.    Payor: MEDICAID / Plan: [place] / Product Type: Managed Medicaid /     Extended Emergency Contact Information  Primary Emergency Contact: RemyJohn Paul  Address: [address] [place] [address] [facility]  Home Phone: [phone]  Mobile Phone: [phone]  Relation: Father  Preferred language: English   needed? No  Secondary Emergency Contact: Lio Kam  Mobile Phone: [phone]  Relation: Sister   needed? No    Discharge Plan A: Home  Discharge Plan B: Home with family      [facility] [place]  [place] - [address]  [address]  [place] [address]  Phone: [phone] Fax: [phone]    [facility] [place] - [address]  [address] [facility]  [facility] [address]  Phone: [phone] Fax: [phone]      Initial Assessment (most recent)     Adult Discharge Assessment - 05/05/22 1135        Discharge Assessment    Assessment Type Discharge Planning Assessment     Confirmed/corrected address, phone number and insurance Yes     Confirmed Demographics Correct on Facesheet     Source of Information patient;family     When was your last doctors appointment? --   3 months ago    Communicated FRANCES with patient/caregiver Date not available/Unable to determine     Reason For Admission hypotension      Lives With parent(s)     Facility Arrived From: home     Do you expect to return to your current living situation? Yes     Do you have help at home or someone to help you manage your care at home? Yes     Who are your caregiver(s) and their phone number(s)? John Paul Jo [phone]     Prior to hospitilization cognitive status: Alert/Oriented     Current cognitive status: Alert/Oriented     Walking or Climbing Stairs Difficulty none     Dressing/Bathing Difficulty none     Equipment Currently Used at Home none     Readmission within 30 days? No     Patient currently being followed by outpatient case management? No     Do you currently have service(s) that help you manage your care at home? No     Do you take prescription medications? Yes     Do you have prescription coverage? Yes     Coverage medicaid     Do you have any problems affording any of your prescribed medications? No     Is the patient taking medications as prescribed? yes     Who is going to help you get home at discharge? John Paul Jo [phone]     How do you get to doctors appointments? family or friend will provide     Are you on dialysis? No     Do you take coumadin? No     Discharge Plan A Home     Discharge Plan B Home with family     DME Needed Upon Discharge  none     Discharge Plan discussed with: Patient;Parent(s)     Name(s) and Number(s) John Paul Jo [phone]     Discharge Barriers Identified Substance Abuse        Relationship/Environment    Name(s) of Who Lives With Patient John Paul Jo [phone]

## 2022-05-05 NOTE — DISCHARGE SUMMARY
[facility]  Discharge Summary      Patient Name: Addy Alberto  MRN: [id number]  Patient Class: OP- Observation  Admission Date: 5/4/2022  Hospital Length of Stay: 0 days  Discharge Date and Time:  05/05/2022 5:26 PM  Attending Physician: Negrito Bragg MD   Discharging Provider: Negrito Bragg MD  Primary Care Provider: [facility]      HPI:     46-year-old male  with PMH of HTN , CHF ?? , on saboxone , ETOH abuse , marijuana, seizure disorder   presents with hypotension.  Patient reports that he is a  and has been working in the heat for several hours for several days recently .   he has been having  lightheaded on standing as well and feels fatigued and weak .  Patient report chest area tenderness to the ER MD but did not complaint about it to me .  IV given and BP improved but low side he is drowsy but answer questions well . Father bedside . He took HCTZ this am .Moving all limbs and no focal weakness. CPK elevated            * No surgery found *      Hospital Course:   Patient is a 46-year-old male who was admitted with heat exhaustion and severe dehydration in addition to rhabdomyolysis and acute kidney injury.  He was given large volume IV fluids with significant improvement in his symptoms in addition to significant improvement in his kidney function and CK levels.  He was feeling much better today and is safe and stable for discharge home with close follow-up with his outpatient primary care physician.  He is at higher risk for recurrent rhabdomyolysis and I advised him to stay out of the heat and to drink plenty of fluids once he returns home.       Goals of Care Treatment Preferences:  Code Status: Full Code      Consults:     No new Assessment & Plan notes have been filed under this hospital service since the last note was generated.  Service: Hospital Medicine    Final Active Diagnoses:    Diagnosis Date Noted POA     PRINCIPAL PROBLEM:  Heat stroke [T67.01XA] 05/04/2022 Unknown    Hypotension [I95.9] 05/04/2022 Unknown    Rhabdomyolysis [M62.82] 05/04/2022 Unknown    Polysubstance abuse [F19.10] 05/04/2022 Unknown    TITO (acute kidney injury) [N17.9] 05/04/2022 Unknown    Essential hypertension [I10] 10/07/2019 Yes    ETOH abuse [F10.10] 10/07/2019 Yes      Problems Resolved During this Admission:       Discharged Condition: good    Disposition: Home or Self Care    Follow Up:   Follow-up Information     [facility]. Schedule an appointment as soon as possible for a visit in 1 week(s).    Contact information:  [address]  [place]  [place] [address]  [phone]                       Patient Instructions:      Diet Adult Regular     No dressing needed     Activity as tolerated       Significant Diagnostic Studies: Labs:   BMP:   Recent Labs   Lab 05/04/22  1447 05/04/22  1746 05/05/22  0234     --  116*     --  141   K 4.4  --  3.7     --  109   CO2 23  --  24   BUN 54*  --  41*   CREATININE 3.0*  --  1.6*   CALCIUM 8.9  --  7.9*   MG  --  2.2  --    , CBC   Recent Labs   Lab 05/04/22  1447 05/04/22  2237   WBC 8.19 7.29   HGB 9.4* 9.6*   HCT 30.4* 30.9*   * 460*    and All labs within the past 24 hours have been reviewed    Pending Diagnostic Studies:     None         Medications:  Reconciled Home Medications:      Medication List      CHANGE how you take these medications    divalproex  MG 24 hr tablet  Commonly known as: DEPAKOTE ER  Take 250 mg by mouth nightly.  What changed: Another medication with the same name was removed. Continue taking this medication, and follow the directions you see here.     EScitalopram oxalate 10 MG tablet  Commonly known as: LEXAPRO  Take 10 mg by mouth every morning.  What changed: Another medication with the same name was removed. Continue taking this medication, and follow the directions you see here.        CONTINUE  taking these medications    acamprosate 333 mg tablet  Commonly known as: CAMPRAL  Take 666 mg by mouth 3 (three) times daily.     hydroCHLOROthiazide 25 MG tablet  Commonly known as: HYDRODIURIL  Take 1 tablet (25 mg total) by mouth once daily.     pantoprazole 40 MG tablet  Commonly known as: PROTONIX  Take 1 tablet (40 mg total) by mouth once daily.     SUBOXONE 8-2 mg  Generic drug: buprenorphine-naloxone 8-2 mg  Place 1 each under the tongue 2 (two) times daily.        STOP taking these medications    buprenorphine HCL 2 mg Subl  Commonly known as: SUBUTEX     busPIRone 15 MG tablet  Commonly known as: BUSPAR     cetirizine 10 MG tablet  Commonly known as: ZYRTEC     cholecalciferol (vitamin D3) 1,250 mcg (50,000 unit) Tab     diazePAM 5 MG tablet  Commonly known as: VALIUM     diltiaZEM 120 MG Cp24  Commonly known as: CARDIZEM CD     folic acid 1 MG tablet  Commonly known as: FOLVITE     furosemide 20 MG tablet  Commonly known as: LASIX     hydrOXYzine HCL 25 MG tablet  Commonly known as: ATARAX     hydrOXYzine pamoate 50 MG Cap  Commonly known as: VISTARIL     L-METHYLFOLATE 15 mg Tab  Generic drug: levomefolate calcium     lisinopriL 10 MG tablet     lisinopriL 5 MG tablet  Commonly known as: PRINIVIL,ZESTRIL     mirtazapine 15 MG tablet  Commonly known as: REMERON     NARCAN 4 mg/actuation Spry  Generic drug: naloxone     omeprazole 40 MG capsule  Commonly known as: PRILOSEC     PHENobarbitaL 32.4 MG tablet  Commonly known as: LUMINAL     prazosin 2 MG Cap  Commonly known as: MINIPRESS     QUEtiapine 50 MG tablet  Commonly known as: SEROQUEL     tamsulosin 0.4 mg Cap  Commonly known as: FLOMAX     tiZANidine 4 MG tablet  Commonly known as: ZANAFLEX            Indwelling Lines/Drains at time of discharge:   Lines/Drains/Airways     None                 Time spent on the discharge of patient: 55 minutes         Negrito Bragg MD  Department of Hospital Medicine  [facility]

## 2022-05-05 NOTE — HOSPITAL COURSE
Patient is a 46-year-old male who was admitted with heat exhaustion and severe dehydration in addition to rhabdomyolysis and acute kidney injury.  He was given large volume IV fluids with significant improvement in his symptoms in addition to significant improvement in his kidney function and CK levels.  He was feeling much better today and is safe and stable for discharge home with close follow-up with his outpatient primary care physician.  He is at higher risk for recurrent rhabdomyolysis and I advised him to stay out of the heat and to drink plenty of fluids once he returns home.

## 2022-05-05 NOTE — SUBJECTIVE & OBJECTIVE
Past Medical History:   Diagnosis Date    ETOH abuse     Hypertension     Pancreatitis     Seizures        Past Surgical History:   Procedure Laterality Date    LEFT HEART CATHETERIZATION Left 10/07/2019    Procedure: Left heart cath;  Surgeon: Sergio Ge MD;  Location: [facility] CATH LAB;  Service: Cardiology;  Laterality: Left;       Review of patient's allergies indicates:   Allergen Reactions    Penicillins        No current facility-administered medications on file prior to encounter.     Current Outpatient Medications on File Prior to Encounter   Medication Sig    acamprosate (CAMPRAL) 333 mg tablet Take 666 mg by mouth 3 (three) times daily.    divalproex ER (DEPAKOTE ER) 250 MG 24 hr tablet Take 250 mg by mouth nightly.    EScitalopram oxalate (LEXAPRO) 10 MG tablet Take 10 mg by mouth every morning.    SUBOXONE 8-2 mg Place 1 each under the tongue 2 (two) times daily.    [DISCONTINUED] buprenorphine HCL (SUBUTEX) 2 mg Subl Place 1 tablet under the tongue 2 (two) times a day.    hydroCHLOROthiazide (HYDRODIURIL) 25 MG tablet Take 1 tablet (25 mg total) by mouth once daily.    pantoprazole (PROTONIX) 40 MG tablet Take 1 tablet (40 mg total) by mouth once daily.    [DISCONTINUED] busPIRone (BUSPAR) 15 MG tablet Take 15 mg by mouth 2 (two) times daily.    [DISCONTINUED] cetirizine (ZYRTEC) 10 MG tablet Take 10 mg by mouth every morning.    [DISCONTINUED] cholecalciferol, vitamin D3, 1,250 mcg (50,000 unit) Tab Take 1 capsule by mouth every 7 days.    [DISCONTINUED] diazePAM (VALIUM) 5 MG tablet Take 5 mg by mouth 2 (two) times daily.    [DISCONTINUED] diltiaZEM (CARDIZEM CD) 120 MG Cp24 Take 120 mg by mouth once daily.    [DISCONTINUED] divalproex ER (DEPAKOTE) 500 MG Tb24 Take 500 mg by mouth once daily.    [DISCONTINUED] escitalopram oxalate (LEXAPRO) 20 MG tablet Take 20 mg by mouth once daily.    [DISCONTINUED] folic acid (FOLVITE) 1 MG tablet Take 1 mg by mouth once daily.    [DISCONTINUED]  furosemide (LASIX) 20 MG tablet Take 20 mg by mouth daily as needed (edema).    [DISCONTINUED] hydrOXYzine HCL (ATARAX) 25 MG tablet Take 25 mg by mouth 3 (three) times daily as needed for Anxiety (sleep).    [DISCONTINUED] hydrOXYzine pamoate (VISTARIL) 50 MG Cap Take 50 mg by mouth every 8 (eight) hours.    [DISCONTINUED] L-METHYLFOLATE 15 mg Tab Take 1 tablet by mouth every morning.    [DISCONTINUED] lisinopril (PRINIVIL,ZESTRIL) 5 MG tablet Take 5 mg by mouth once daily.    [DISCONTINUED] lisinopriL 10 MG tablet Take 10 mg by mouth once daily.    [DISCONTINUED] mirtazapine (REMERON) 15 MG tablet Take 15 mg by mouth nightly.    [DISCONTINUED] NARCAN 4 mg/actuation Spry SMARTSIG:Both Nares    [DISCONTINUED] omeprazole (PRILOSEC) 40 MG capsule Take 40 mg by mouth every morning.    [DISCONTINUED] PHENobarbitaL (LUMINAL) 32.4 MG tablet Take by mouth.    [DISCONTINUED] prazosin (MINIPRESS) 2 MG Cap Take 2 mg by mouth nightly.    [DISCONTINUED] QUEtiapine (SEROQUEL) 50 MG tablet Take 50 mg by mouth nightly.    [DISCONTINUED] tamsulosin (FLOMAX) 0.4 mg Cap Take 0.4 mg by mouth once daily.    [DISCONTINUED] tiZANidine (ZANAFLEX) 4 MG tablet Take 4 mg by mouth 3 (three) times daily.     Family History       Problem Relation (Age of Onset)    No Known Problems Mother          Tobacco Use    Smoking status: Current Some Day Smoker     Packs/day: 1.00     Types: Cigarettes    Smokeless tobacco: Not on file   Substance and Sexual Activity    Alcohol use: Yes     Comment: DAILY    Drug use: Yes     Types: Marijuana     Comment: occassionally    Sexual activity: Not Currently     Review of Systems   Constitutional:  Negative for activity change, appetite change and diaphoresis.   HENT:  Negative for congestion, facial swelling and sinus pressure.    Respiratory:  Negative for shortness of breath and wheezing.    Cardiovascular:  Negative for chest pain and palpitations.   Gastrointestinal:  Negative for abdominal distention and  abdominal pain.   Genitourinary:  Negative for dysuria.   Skin:  Negative for color change and pallor.   Neurological:  Negative for dizziness, facial asymmetry, speech difficulty and headaches.   Psychiatric/Behavioral:  Negative for agitation and confusion.    All other systems reviewed and are negative.  Objective:     Vital Signs (Most Recent):  Temp: 98.3 °F (36.8 °C) (05/04/22 1422)  Pulse: (!) 57 (05/04/22 2016)  Resp: 14 (05/04/22 2016)  BP: 103/67 (05/04/22 2016)  SpO2: 99 % (05/04/22 2016)   Vital Signs (24h Range):  Temp:  [98.3 °F (36.8 °C)] 98.3 °F (36.8 °C)  Pulse:  [51-66] 57  Resp:  [12-44] 14  SpO2:  [88 %-99 %] 99 %  BP: ()/(43-70) 103/67     Weight: 77.1 kg (170 lb)  Body mass index is 24.39 kg/m².    Physical Exam  Constitutional:       General: He is not in acute distress.     Appearance: He is well-developed.   HENT:      Head: Normocephalic.      Nose: Nose normal.      Mouth/Throat:      Mouth: Mucous membranes are moist.   Eyes:      Pupils: Pupils are equal, round, and reactive to light.   Cardiovascular:      Rate and Rhythm: Normal rate and regular rhythm.   Pulmonary:      Effort: Pulmonary effort is normal. No respiratory distress.   Abdominal:      General: There is no distension.      Tenderness: There is no abdominal tenderness.   Musculoskeletal:         General: Normal range of motion.      Cervical back: Normal range of motion and neck supple.   Skin:     General: Skin is warm.      Findings: No rash.   Neurological:      Mental Status: He is alert and oriented to person, place, and time.   Psychiatric:         Mood and Affect: Mood normal.         CRANIAL NERVES     CN III, IV, VI   Pupils are equal, round, and reactive to light.     Significant Labs: All pertinent labs within the past 24 hours have been reviewed.  CBC:   Recent Labs   Lab 05/04/22  1447   WBC 8.19   HGB 9.4*   HCT 30.4*   *     CMP:   Recent Labs   Lab 05/04/22  1447      K 4.4      CO2  23      BUN 54*   CREATININE 3.0*   CALCIUM 8.9   PROT 6.7   ALBUMIN 4.0   BILITOT 0.4   ALKPHOS 47*   AST 22   ALT 17   ANIONGAP 13   EGFRNONAA 23.6*     Cardiac Markers:   Recent Labs   Lab 05/04/22  1447   BNP 15       Significant Imaging: I have reviewed all pertinent imaging results/findings within the past 24 hours.

## 2022-05-05 NOTE — H&P
[facility] - Emergency Dept  Hospital Medicine  History & Physical    Patient Name: Addy Alberto  MRN: [id number]  Patient Class: OP- Observation  Admission Date: 5/4/2022  Attending Physician: Jas Castro MD   Primary Care Provider: [facility]         Patient information was obtained from patient, parent and ER records.     Subjective:     Principal Problem:Heat stroke    Chief Complaint:   Chief Complaint   Patient presents with    LOW BP     TODAY 80/40s     GEN WEAKNESS    Leg Pain     BILAT AT GROINS        HPI:   46-year-old male  with PMH of HTN , CHF ?? , on saboxone , ETOH abuse , marijuana, seizure disorder   presents with hypotension.  Patient reports that he is a  and has been working in the heat for several hours for several days recently .   he has been having  lightheaded on standing as well and feels fatigued and weak .  Patient report chest area tenderness to the ER MD but did not complaint about it to me .  IV given and BP improved but low side he is drowsy but answer questions well . Father bedside . He took HCTZ this am .Moving all limbs and no focal weakness. CPK elevated            No new subjective & objective note has been filed under this hospital service since the last note was generated.    Assessment/Plan:     * Heat stroke  IVF , monitor CPK   Hold drowsy meds      Hypotension  Secondary to dehydration / BP meds  And diuretics    Plan   IVF   CT head and CXR neg      TITO (acute kidney injury)  From dehydration and HCTZ    Plan   IVF and monitor renal function       Polysubstance abuse  Continue home saboxone       Rhabdomyolysis  From heat stroke   Monitor CPK      ETOH abuse  Currently not taking       Essential hypertension  Now hypotension   Hold all BP meds        VTE Risk Mitigation (From admission, onward)    None             Jas Castro MD  Department of Hospital Medicine   [facility] - Emergency Dept

## 2022-05-05 NOTE — PLAN OF CARE
[Pt compliant with POC. RR even and unlabored . No signs of distress noted. Discharged With Father , Tele removed .       Problem: Adult Inpatient Plan of Care  Goal: Plan of Care Review  Outcome: Ongoing, Progressing  Goal: Patient-Specific Goal (Individualized)  Outcome: Ongoing, Progressing  Goal: Absence of Hospital-Acquired Illness or Injury  Outcome: Ongoing, Progressing  Goal: Optimal Comfort and Wellbeing  Outcome: Ongoing, Progressing  Goal: Readiness for Transition of Care  Outcome: Ongoing, Progressing     Problem: Fluid and Electrolyte Imbalance (Acute Kidney Injury/Impairment)  Goal: Fluid and Electrolyte Balance  Outcome: Ongoing, Progressing

## 2022-05-05 NOTE — CARE UPDATE
Continue oxygen   05/05/22 0800   Patient Assessment/Suction   Level of Consciousness (AVPU) alert   Respiratory Effort Normal;Unlabored   PRE-TX-O2   O2 Device (Oxygen Therapy) nasal cannula   $ Is the patient on Low Flow Oxygen? Yes   Flow (L/min) 1   Pulse Oximetry Type Continuous   $ Pulse Oximetry - Multiple Charge Pulse Oximetry - Multiple   Education   $ Education DME Oxygen;15 min   Respiratory Evaluation   $ Care Plan Tech Time 15 min   $ Eval/Re-eval Charges Re-evaluation

## 2022-05-05 NOTE — H&P
[facility] - Emergency Dept  Hospital Medicine  History & Physical    Patient Name: Addy Alberto  MRN: [id number]  Patient Class: OP- Observation  Admission Date: 5/4/2022  Attending Physician: Jas Castro MD   Primary Care Provider: [facility]         Patient information was obtained from patient, parent and ER records.     Subjective:     Principal Problem:Heat stroke    Chief Complaint:   Chief Complaint   Patient presents with    LOW BP     TODAY 80/40s     GEN WEAKNESS    Leg Pain     BILAT AT GROINS        HPI:   46-year-old male  with PMH of HTN , CHF ?? , on saboxone , ETOH abuse , marijuana, seizure disorder   presents with hypotension.  Patient reports that he is a  and has been working in the heat for several hours for several days recently .   he has been having  lightheaded on standing as well and feels fatigued and weak .  Patient report chest area tenderness to the ER MD but did not complaint about it to me .  IV given and BP improved but low side he is drowsy but answer questions well . Father bedside . He took HCTZ this am .Moving all limbs and no focal weakness. CPK elevated            Past Medical History:   Diagnosis Date    ETOH abuse     Hypertension     Pancreatitis     Seizures        Past Surgical History:   Procedure Laterality Date    LEFT HEART CATHETERIZATION Left 10/07/2019    Procedure: Left heart cath;  Surgeon: Sergio Ge MD;  Location: [facility] CATH LAB;  Service: Cardiology;  Laterality: Left;       Review of patient's allergies indicates:   Allergen Reactions    Penicillins        No current facility-administered medications on file prior to encounter.     Current Outpatient Medications on File Prior to Encounter   Medication Sig    acamprosate (CAMPRAL) 333 mg tablet Take 666 mg by mouth 3 (three) times daily.    divalproex ER (DEPAKOTE ER) 250 MG 24 hr tablet Take 250 mg by mouth nightly.     EScitalopram oxalate (LEXAPRO) 10 MG tablet Take 10 mg by mouth every morning.    SUBOXONE 8-2 mg Place 1 each under the tongue 2 (two) times daily.    [DISCONTINUED] buprenorphine HCL (SUBUTEX) 2 mg Subl Place 1 tablet under the tongue 2 (two) times a day.    hydroCHLOROthiazide (HYDRODIURIL) 25 MG tablet Take 1 tablet (25 mg total) by mouth once daily.    pantoprazole (PROTONIX) 40 MG tablet Take 1 tablet (40 mg total) by mouth once daily.    [DISCONTINUED] busPIRone (BUSPAR) 15 MG tablet Take 15 mg by mouth 2 (two) times daily.    [DISCONTINUED] cetirizine (ZYRTEC) 10 MG tablet Take 10 mg by mouth every morning.    [DISCONTINUED] cholecalciferol, vitamin D3, 1,250 mcg (50,000 unit) Tab Take 1 capsule by mouth every 7 days.    [DISCONTINUED] diazePAM (VALIUM) 5 MG tablet Take 5 mg by mouth 2 (two) times daily.    [DISCONTINUED] diltiaZEM (CARDIZEM CD) 120 MG Cp24 Take 120 mg by mouth once daily.    [DISCONTINUED] divalproex ER (DEPAKOTE) 500 MG Tb24 Take 500 mg by mouth once daily.    [DISCONTINUED] escitalopram oxalate (LEXAPRO) 20 MG tablet Take 20 mg by mouth once daily.    [DISCONTINUED] folic acid (FOLVITE) 1 MG tablet Take 1 mg by mouth once daily.    [DISCONTINUED] furosemide (LASIX) 20 MG tablet Take 20 mg by mouth daily as needed (edema).    [DISCONTINUED] hydrOXYzine HCL (ATARAX) 25 MG tablet Take 25 mg by mouth 3 (three) times daily as needed for Anxiety (sleep).    [DISCONTINUED] hydrOXYzine pamoate (VISTARIL) 50 MG Cap Take 50 mg by mouth every 8 (eight) hours.    [DISCONTINUED] L-METHYLFOLATE 15 mg Tab Take 1 tablet by mouth every morning.    [DISCONTINUED] lisinopril (PRINIVIL,ZESTRIL) 5 MG tablet Take 5 mg by mouth once daily.    [DISCONTINUED] lisinopriL 10 MG tablet Take 10 mg by mouth once daily.    [DISCONTINUED] mirtazapine (REMERON) 15 MG tablet Take 15 mg by mouth nightly.    [DISCONTINUED] NARCAN 4 mg/actuation Spry SMARTSIG:Both Nares    [DISCONTINUED] omeprazole  (PRILOSEC) 40 MG capsule Take 40 mg by mouth every morning.    [DISCONTINUED] PHENobarbitaL (LUMINAL) 32.4 MG tablet Take by mouth.    [DISCONTINUED] prazosin (MINIPRESS) 2 MG Cap Take 2 mg by mouth nightly.    [DISCONTINUED] QUEtiapine (SEROQUEL) 50 MG tablet Take 50 mg by mouth nightly.    [DISCONTINUED] tamsulosin (FLOMAX) 0.4 mg Cap Take 0.4 mg by mouth once daily.    [DISCONTINUED] tiZANidine (ZANAFLEX) 4 MG tablet Take 4 mg by mouth 3 (three) times daily.     Family History       Problem Relation (Age of Onset)    No Known Problems Mother          Tobacco Use    Smoking status: Current Some Day Smoker     Packs/day: 1.00     Types: Cigarettes    Smokeless tobacco: Not on file   Substance and Sexual Activity    Alcohol use: Yes     Comment: DAILY    Drug use: Yes     Types: Marijuana     Comment: occassionally    Sexual activity: Not Currently     Review of Systems   Constitutional:  Negative for activity change, appetite change and diaphoresis.   HENT:  Negative for congestion, facial swelling and sinus pressure.    Respiratory:  Negative for shortness of breath and wheezing.    Cardiovascular:  Negative for chest pain and palpitations.   Gastrointestinal:  Negative for abdominal distention and abdominal pain.   Genitourinary:  Negative for dysuria.   Skin:  Negative for color change and pallor.   Neurological:  Negative for dizziness, facial asymmetry, speech difficulty and headaches.   Psychiatric/Behavioral:  Negative for agitation and confusion.    All other systems reviewed and are negative.  Objective:     Vital Signs (Most Recent):  Temp: 98.3 °F (36.8 °C) (05/04/22 1422)  Pulse: (!) 57 (05/04/22 2016)  Resp: 14 (05/04/22 2016)  BP: 103/67 (05/04/22 2016)  SpO2: 99 % (05/04/22 2016)   Vital Signs (24h Range):  Temp:  [98.3 °F (36.8 °C)] 98.3 °F (36.8 °C)  Pulse:  [51-66] 57  Resp:  [12-44] 14  SpO2:  [88 %-99 %] 99 %  BP: ()/(43-70) 103/67     Weight: 77.1 kg (170 lb)  Body mass index is  24.39 kg/m².    Physical Exam  Constitutional:       General: He is not in acute distress.     Appearance: He is well-developed.   HENT:      Head: Normocephalic.      Nose: Nose normal.      Mouth/Throat:      Mouth: Mucous membranes are moist.   Eyes:      Pupils: Pupils are equal, round, and reactive to light.   Cardiovascular:      Rate and Rhythm: Normal rate and regular rhythm.   Pulmonary:      Effort: Pulmonary effort is normal. No respiratory distress.   Abdominal:      General: There is no distension.      Tenderness: There is no abdominal tenderness.   Musculoskeletal:         General: Normal range of motion.      Cervical back: Normal range of motion and neck supple.   Skin:     General: Skin is warm.      Findings: No rash.   Neurological:      Mental Status: He is alert and oriented to person, place, and time.   Psychiatric:         Mood and Affect: Mood normal.         CRANIAL NERVES     CN III, IV, VI   Pupils are equal, round, and reactive to light.     Significant Labs: All pertinent labs within the past 24 hours have been reviewed.  CBC:   Recent Labs   Lab 05/04/22  1447   WBC 8.19   HGB 9.4*   HCT 30.4*   *     CMP:   Recent Labs   Lab 05/04/22  1447      K 4.4      CO2 23      BUN 54*   CREATININE 3.0*   CALCIUM 8.9   PROT 6.7   ALBUMIN 4.0   BILITOT 0.4   ALKPHOS 47*   AST 22   ALT 17   ANIONGAP 13   EGFRNONAA 23.6*     Cardiac Markers:   Recent Labs   Lab 05/04/22  1447   BNP 15       Significant Imaging: I have reviewed all pertinent imaging results/findings within the past 24 hours.    Assessment/Plan:     * Heat stroke  IVF , monitor CPK   Hold drowsy meds      Hypotension  Secondary to dehydration / BP meds  And diuretics    Plan   IVF   CT head and CXR neg      TITO (acute kidney injury)  From dehydration and HCTZ    Plan   IVF and monitor renal function       Polysubstance abuse  Continue home saboxone       Rhabdomyolysis  From heat stroke   Monitor  CPK      ETOH abuse  Currently not taking       Essential hypertension  Now hypotension   Hold all BP meds      VTE Risk Mitigation (From admission, onward)    None             Jas Castro MD  Department of Hospital Medicine   [facility] - Emergency Dept

## 2022-05-05 NOTE — NURSING TRANSFER
Nursing Transfer Note      5/5/2022     Reason patient is being transferred: Change in level of care    Transfer To: 1103    Transfer via wheelchair    Transfer with belongings    Transported by Lyric RN    Medicines sent: NA    Any special needs or follow-up needed: NA    Chart send with patient: Yes    Notified: pt notified father    Patient reassessed at: 5/5/2022 1886 (date, time)    Upon arrival to floor: cardiac monitor applied, patient oriented to room, call bell in reach and bed in lowest position    Report given to Jadyn LAZAR

## 2022-05-06 NOTE — PLAN OF CARE
Chart and discharge orders reviewed.  Patient discharged home after hours with no further case management needs.     05/06/22 1234   Final Note   Assessment Type Final Discharge Note   Anticipated Discharge Disposition Home   Post-Acute Status   Discharge Delays None known at this time

## 2022-05-09 LAB
BACTERIA BLD CULT: NORMAL
BACTERIA BLD CULT: NORMAL

## 2022-05-19 ENCOUNTER — LAB VISIT (OUTPATIENT)
Dept: LAB | Facility: HOSPITAL | Age: 47
End: 2022-05-19
Attending: NURSE PRACTITIONER
Payer: MEDICAID

## 2022-05-19 DIAGNOSIS — Z87.828 HX OF INJURY: Primary | ICD-10-CM

## 2022-05-19 LAB
BILIRUB UR QL STRIP: NEGATIVE
CLARITY UR: CLEAR
COLOR UR: YELLOW
GLUCOSE UR QL STRIP: NEGATIVE
HGB UR QL STRIP: NEGATIVE
KETONES UR QL STRIP: NEGATIVE
LEUKOCYTE ESTERASE UR QL STRIP: NEGATIVE
NITRITE UR QL STRIP: NEGATIVE
PH UR STRIP: 6 [PH] (ref 5–8)
PHOSPHATE SERPL-MCNC: 3.4 MG/DL (ref 2.7–4.5)
PROT UR QL STRIP: NEGATIVE
SP GR UR STRIP: 1.01 (ref 1–1.03)
URN SPEC COLLECT METH UR: NORMAL
UROBILINOGEN UR STRIP-ACNC: NEGATIVE EU/DL

## 2022-05-19 PROCEDURE — 81003 URINALYSIS AUTO W/O SCOPE: CPT | Performed by: NURSE PRACTITIONER

## 2022-05-19 PROCEDURE — 84100 ASSAY OF PHOSPHORUS: CPT | Performed by: NURSE PRACTITIONER

## 2022-05-19 PROCEDURE — 36415 COLL VENOUS BLD VENIPUNCTURE: CPT | Performed by: NURSE PRACTITIONER

## 2022-06-20 DIAGNOSIS — R10.9 STOMACH ACHE: Primary | ICD-10-CM

## 2022-06-29 ENCOUNTER — HOSPITAL ENCOUNTER (OUTPATIENT)
Dept: RADIOLOGY | Facility: HOSPITAL | Age: 47
Discharge: HOME | End: 2022-06-29
Attending: FAMILY MEDICINE
Payer: MEDICAID

## 2022-06-29 DIAGNOSIS — R10.9 STOMACH ACHE: ICD-10-CM

## 2022-06-29 PROCEDURE — 76700 US EXAM ABDOM COMPLETE: CPT | Mod: TC

## 2022-06-29 PROCEDURE — 76700 US ABDOMEN COMPLETE: ICD-10-PCS | Mod: 26,,, | Performed by: RADIOLOGY

## 2022-06-29 PROCEDURE — 76700 US EXAM ABDOM COMPLETE: CPT | Mod: 26,,, | Performed by: RADIOLOGY

## 2022-10-20 ENCOUNTER — LAB VISIT (OUTPATIENT)
Dept: LAB | Facility: HOSPITAL | Age: 47
End: 2022-10-20
Attending: INTERNAL MEDICINE
Payer: MEDICAID

## 2022-10-20 DIAGNOSIS — R00.2 PALPITATIONS: ICD-10-CM

## 2022-10-20 DIAGNOSIS — R60.0 LOCALIZED EDEMA: ICD-10-CM

## 2022-10-20 DIAGNOSIS — E78.2 MIXED HYPERLIPIDEMIA: Primary | ICD-10-CM

## 2022-10-20 DIAGNOSIS — I10 ESSENTIAL HYPERTENSION, MALIGNANT: ICD-10-CM

## 2022-10-20 DIAGNOSIS — R42 DIZZINESS AND GIDDINESS: ICD-10-CM

## 2022-10-20 LAB
ALBUMIN SERPL BCP-MCNC: 3.5 G/DL (ref 3.5–5.2)
ALP SERPL-CCNC: 48 U/L (ref 55–135)
ALT SERPL W/O P-5'-P-CCNC: 19 U/L (ref 10–44)
ANION GAP SERPL CALC-SCNC: 8 MMOL/L (ref 8–16)
AST SERPL-CCNC: 19 U/L (ref 10–40)
BILIRUB SERPL-MCNC: 0.3 MG/DL (ref 0.1–1)
BUN SERPL-MCNC: 14 MG/DL (ref 6–20)
CALCIUM SERPL-MCNC: 9.4 MG/DL (ref 8.7–10.5)
CHLORIDE SERPL-SCNC: 99 MMOL/L (ref 95–110)
CHOLEST SERPL-MCNC: 131 MG/DL (ref 120–199)
CHOLEST/HDLC SERPL: 3.5 {RATIO} (ref 2–5)
CO2 SERPL-SCNC: 28 MMOL/L (ref 23–29)
CREAT SERPL-MCNC: 1 MG/DL (ref 0.5–1.4)
ERYTHROCYTE [DISTWIDTH] IN BLOOD BY AUTOMATED COUNT: 18.8 % (ref 11.5–14.5)
EST. GFR  (NO RACE VARIABLE): >60 ML/MIN/1.73 M^2
GLUCOSE SERPL-MCNC: 106 MG/DL (ref 70–110)
HCT VFR BLD AUTO: 32.9 % (ref 40–54)
HDLC SERPL-MCNC: 37 MG/DL (ref 40–75)
HDLC SERPL: 28.2 % (ref 20–50)
HGB BLD-MCNC: 10.3 G/DL (ref 14–18)
LDLC SERPL CALC-MCNC: 67.2 MG/DL (ref 63–159)
MCH RBC QN AUTO: 25.9 PG (ref 27–31)
MCHC RBC AUTO-ENTMCNC: 31.3 G/DL (ref 32–36)
MCV RBC AUTO: 83 FL (ref 82–98)
NONHDLC SERPL-MCNC: 94 MG/DL
PLATELET # BLD AUTO: 448 K/UL (ref 150–450)
PMV BLD AUTO: 9 FL (ref 9.2–12.9)
POTASSIUM SERPL-SCNC: 4 MMOL/L (ref 3.5–5.1)
PROT SERPL-MCNC: 6.9 G/DL (ref 6–8.4)
RBC # BLD AUTO: 3.98 M/UL (ref 4.6–6.2)
SODIUM SERPL-SCNC: 135 MMOL/L (ref 136–145)
TRIGL SERPL-MCNC: 134 MG/DL (ref 30–150)
TSH SERPL DL<=0.005 MIU/L-ACNC: 0.82 UIU/ML (ref 0.34–5.6)
WBC # BLD AUTO: 7.18 K/UL (ref 3.9–12.7)

## 2022-10-20 PROCEDURE — 84443 ASSAY THYROID STIM HORMONE: CPT | Performed by: INTERNAL MEDICINE

## 2022-10-20 PROCEDURE — 85027 COMPLETE CBC AUTOMATED: CPT | Performed by: INTERNAL MEDICINE

## 2022-10-20 PROCEDURE — 80061 LIPID PANEL: CPT | Performed by: INTERNAL MEDICINE

## 2022-10-20 PROCEDURE — 80053 COMPREHEN METABOLIC PANEL: CPT | Performed by: INTERNAL MEDICINE

## 2022-10-20 PROCEDURE — 36415 COLL VENOUS BLD VENIPUNCTURE: CPT | Performed by: INTERNAL MEDICINE

## 2022-11-15 ENCOUNTER — LAB VISIT (OUTPATIENT)
Dept: LAB | Facility: HOSPITAL | Age: 47
End: 2022-11-15
Attending: SPECIALIST
Payer: MEDICAID

## 2022-11-15 DIAGNOSIS — R11.0 NAUSEA: ICD-10-CM

## 2022-11-15 DIAGNOSIS — B18.2 CHRONIC HEPATITIS C WITH HEPATIC COMA: ICD-10-CM

## 2022-11-15 DIAGNOSIS — R63.0 ANOREXIA: ICD-10-CM

## 2022-11-15 DIAGNOSIS — R10.13 ABDOMINAL PAIN, EPIGASTRIC: Primary | ICD-10-CM

## 2022-11-15 LAB
ALBUMIN SERPL BCP-MCNC: 3.8 G/DL (ref 3.5–5.2)
ALP SERPL-CCNC: 51 U/L (ref 55–135)
ALT SERPL W/O P-5'-P-CCNC: 32 U/L (ref 10–44)
AMYLASE SERPL-CCNC: 76 U/L (ref 20–110)
ANION GAP SERPL CALC-SCNC: 5 MMOL/L (ref 8–16)
AST SERPL-CCNC: 31 U/L (ref 10–40)
BILIRUB SERPL-MCNC: 0.1 MG/DL (ref 0.1–1)
BUN SERPL-MCNC: 56 MG/DL (ref 6–20)
CALCIUM SERPL-MCNC: 9.1 MG/DL (ref 8.7–10.5)
CHLORIDE SERPL-SCNC: 94 MMOL/L (ref 95–110)
CO2 SERPL-SCNC: 34 MMOL/L (ref 23–29)
CREAT SERPL-MCNC: 2.2 MG/DL (ref 0.5–1.4)
ERYTHROCYTE [DISTWIDTH] IN BLOOD BY AUTOMATED COUNT: 18.7 % (ref 11.5–14.5)
EST. GFR  (NO RACE VARIABLE): 36 ML/MIN/1.73 M^2
GLUCOSE SERPL-MCNC: 117 MG/DL (ref 70–110)
HCT VFR BLD AUTO: 36.8 % (ref 40–54)
HGB BLD-MCNC: 11.2 G/DL (ref 14–18)
LIPASE SERPL-CCNC: 45 U/L (ref 4–60)
MCH RBC QN AUTO: 26.5 PG (ref 27–31)
MCHC RBC AUTO-ENTMCNC: 30.4 G/DL (ref 32–36)
MCV RBC AUTO: 87 FL (ref 82–98)
PLATELET # BLD AUTO: 431 K/UL (ref 150–450)
PMV BLD AUTO: 9.1 FL (ref 9.2–12.9)
POTASSIUM SERPL-SCNC: 4.9 MMOL/L (ref 3.5–5.1)
PROT SERPL-MCNC: 7.4 G/DL (ref 6–8.4)
RBC # BLD AUTO: 4.22 M/UL (ref 4.6–6.2)
SODIUM SERPL-SCNC: 133 MMOL/L (ref 136–145)
WBC # BLD AUTO: 7.9 K/UL (ref 3.9–12.7)

## 2022-11-15 PROCEDURE — 85027 COMPLETE CBC AUTOMATED: CPT | Performed by: SPECIALIST

## 2022-11-15 PROCEDURE — 82150 ASSAY OF AMYLASE: CPT | Performed by: SPECIALIST

## 2022-11-15 PROCEDURE — 80053 COMPREHEN METABOLIC PANEL: CPT | Performed by: SPECIALIST

## 2022-11-15 PROCEDURE — 36415 COLL VENOUS BLD VENIPUNCTURE: CPT | Performed by: SPECIALIST

## 2022-11-15 PROCEDURE — 83690 ASSAY OF LIPASE: CPT | Performed by: SPECIALIST

## 2022-11-22 ENCOUNTER — OFFICE VISIT (OUTPATIENT)
Dept: NEUROLOGY | Facility: CLINIC | Age: 47
End: 2022-11-22
Payer: MEDICAID

## 2022-11-22 VITALS
DIASTOLIC BLOOD PRESSURE: 64 MMHG | SYSTOLIC BLOOD PRESSURE: 100 MMHG | HEIGHT: 70 IN | WEIGHT: 156.88 LBS | BODY MASS INDEX: 22.46 KG/M2

## 2022-11-22 DIAGNOSIS — G43.709 CHRONIC MIGRAINE W/O AURA W/O STATUS MIGRAINOSUS, NOT INTRACTABLE: ICD-10-CM

## 2022-11-22 DIAGNOSIS — R41.3 MEMORY CHANGE: ICD-10-CM

## 2022-11-22 DIAGNOSIS — G40.409 OTHER GENERALIZED EPILEPSY, NOT INTRACTABLE, WITHOUT STATUS EPILEPTICUS: Primary | ICD-10-CM

## 2022-11-22 PROCEDURE — 4010F PR ACE/ARB THEARPY RXD/TAKEN: ICD-10-PCS | Mod: CPTII,,, | Performed by: PSYCHIATRY & NEUROLOGY

## 2022-11-22 PROCEDURE — 3078F DIAST BP <80 MM HG: CPT | Mod: CPTII,,, | Performed by: PSYCHIATRY & NEUROLOGY

## 2022-11-22 PROCEDURE — 99999 PR PBB SHADOW E&M-EST. PATIENT-LVL IV: CPT | Mod: PBBFAC,,, | Performed by: PSYCHIATRY & NEUROLOGY

## 2022-11-22 PROCEDURE — 99999 PR PBB SHADOW E&M-EST. PATIENT-LVL IV: ICD-10-PCS | Mod: PBBFAC,,, | Performed by: PSYCHIATRY & NEUROLOGY

## 2022-11-22 PROCEDURE — 99205 OFFICE O/P NEW HI 60 MIN: CPT | Mod: S$PBB,,, | Performed by: PSYCHIATRY & NEUROLOGY

## 2022-11-22 PROCEDURE — 3008F PR BODY MASS INDEX (BMI) DOCUMENTED: ICD-10-PCS | Mod: CPTII,,, | Performed by: PSYCHIATRY & NEUROLOGY

## 2022-11-22 PROCEDURE — 3078F PR MOST RECENT DIASTOLIC BLOOD PRESSURE < 80 MM HG: ICD-10-PCS | Mod: CPTII,,, | Performed by: PSYCHIATRY & NEUROLOGY

## 2022-11-22 PROCEDURE — 3074F SYST BP LT 130 MM HG: CPT | Mod: CPTII,,, | Performed by: PSYCHIATRY & NEUROLOGY

## 2022-11-22 PROCEDURE — 4010F ACE/ARB THERAPY RXD/TAKEN: CPT | Mod: CPTII,,, | Performed by: PSYCHIATRY & NEUROLOGY

## 2022-11-22 PROCEDURE — 99205 PR OFFICE/OUTPT VISIT, NEW, LEVL V, 60-74 MIN: ICD-10-PCS | Mod: S$PBB,,, | Performed by: PSYCHIATRY & NEUROLOGY

## 2022-11-22 PROCEDURE — 1159F PR MEDICATION LIST DOCUMENTED IN MEDICAL RECORD: ICD-10-PCS | Mod: CPTII,,, | Performed by: PSYCHIATRY & NEUROLOGY

## 2022-11-22 PROCEDURE — 99214 OFFICE O/P EST MOD 30 MIN: CPT | Mod: PBBFAC,PN | Performed by: PSYCHIATRY & NEUROLOGY

## 2022-11-22 PROCEDURE — 1159F MED LIST DOCD IN RCRD: CPT | Mod: CPTII,,, | Performed by: PSYCHIATRY & NEUROLOGY

## 2022-11-22 PROCEDURE — 3074F PR MOST RECENT SYSTOLIC BLOOD PRESSURE < 130 MM HG: ICD-10-PCS | Mod: CPTII,,, | Performed by: PSYCHIATRY & NEUROLOGY

## 2022-11-22 PROCEDURE — 3008F BODY MASS INDEX DOCD: CPT | Mod: CPTII,,, | Performed by: PSYCHIATRY & NEUROLOGY

## 2022-11-22 RX ORDER — MIRTAZAPINE 15 MG/1
TABLET, FILM COATED ORAL
COMMUNITY
Start: 2022-11-07

## 2022-11-22 RX ORDER — ESCITALOPRAM OXALATE 20 MG/1
TABLET ORAL
COMMUNITY
Start: 2022-11-07

## 2022-11-22 RX ORDER — POTASSIUM CHLORIDE 1500 MG/1
TABLET, EXTENDED RELEASE ORAL
COMMUNITY
Start: 2022-11-07

## 2022-11-22 RX ORDER — TORSEMIDE 20 MG/1
20 TABLET ORAL 2 TIMES DAILY
COMMUNITY
Start: 2022-08-16

## 2022-11-22 RX ORDER — DIVALPROEX SODIUM 250 MG/1
TABLET, DELAYED RELEASE ORAL
Qty: 180 TABLET | Refills: 3 | Status: SHIPPED | OUTPATIENT
Start: 2022-11-22

## 2022-11-22 RX ORDER — DIAZEPAM 5 MG/1
TABLET ORAL
COMMUNITY
Start: 2022-11-08

## 2022-11-22 RX ORDER — LISINOPRIL 20 MG/1
20 TABLET ORAL DAILY
COMMUNITY
Start: 2022-10-03

## 2022-11-22 RX ORDER — RIZATRIPTAN BENZOATE 10 MG/1
TABLET ORAL
Qty: 12 TABLET | Refills: 2 | Status: SHIPPED | OUTPATIENT
Start: 2022-11-22

## 2022-11-22 RX ORDER — DIVALPROEX SODIUM 500 MG/1
TABLET, FILM COATED, EXTENDED RELEASE ORAL
COMMUNITY
Start: 2022-11-07 | End: 2022-11-22 | Stop reason: DRUGHIGH

## 2022-11-22 RX ORDER — ONDANSETRON 4 MG/1
TABLET, ORALLY DISINTEGRATING ORAL
COMMUNITY
Start: 2022-11-07

## 2022-11-22 RX ORDER — ZONISAMIDE 50 MG/1
CAPSULE ORAL
Qty: 63 CAPSULE | Refills: 0 | Status: SHIPPED | OUTPATIENT
Start: 2022-11-22 | End: 2022-12-20

## 2022-11-22 RX ORDER — CHLORZOXAZONE 500 MG/1
TABLET ORAL
COMMUNITY
Start: 2022-11-03

## 2022-11-22 RX ORDER — HYDROXYZINE PAMOATE 50 MG/1
50 CAPSULE ORAL EVERY 6 HOURS PRN
COMMUNITY
Start: 2022-08-29

## 2022-11-22 RX ORDER — OMEPRAZOLE 40 MG/1
40 CAPSULE, DELAYED RELEASE ORAL DAILY
COMMUNITY
Start: 2022-11-08

## 2022-11-22 NOTE — PATIENT INSTRUCTIONS
Please go to the lab and get a depakote level in 7-10 days    Change depakote to 750mg ER twice a day starting tomorrow.     Start the zonisamide using only the 50 mg strength, then you will start 200mg at night the 2nd month.

## 2022-11-22 NOTE — PROGRESS NOTES
"Subjective:       Patient ID: Addy Alberto is a 47 y.o. male.    Chief Complaint: Pain and Migraine      47 year old WM smoker with epilepsy, migraines, HTN and pancreatitis times 2,  treated Hep C, presents with his father and reports a lifelong history of szrs and migraines, with poor control of both despite current dosage of Depakote.   He states both problems began at age 14    Phenobarbital was the first ASM used. He does not recall a problem with it.   Dilantin---dc'd bc interacted meds he was taking form Hep C ( treated and says is cured)  Gabapentin-for both migraines and seizures. He was taken off many years ago when he had a new doctor ut he does not recall the reason why.   He does not recall a trial of tegretol    Does not like the VPA, says gabapentin helped headaches better and VPA causes agitation   Takes /250    The szrs are a grand mal, " I've lost every job I've ever had". He has had them on the job ( works in construction although currently out of work for 3 months since a hospitalization after having a szr while at work.  + hx of self harm, fracture cheekbone due to a fall during a szr and had numerous fractures, lacerations and broken teeth.     Might have had a couple in his sleep, but most occur during the day. He does not get a warning.   His father states he generally just falls down with no warning and then shakes, + urinary incontinence once ever.   Pt amnestic for the szrs.   Afterwards he is very sore and exhausted.No focal symptoms after a szr.    Last one was 3 months ago, not sure when the one prior was but had 3-4 in the past year.   Can go one year without one.  Generally he suddenly falls and then shakes. Once he looked funny and started running and slammed into a tree face first, knocking himself out.     No FH of epilepsy  No CVA  Only CHT was due to szrs.   Drugs--THC 1-2 times a week.    Etoh--was an alcoholic, sober for 1 1/2 years. Did relapse times one week for " "which he was admitted to rehab for one wee ( this occurred after the szr 3 months ago)  Birth  Brain  MRI normal 10-    Last work up and last neuro visit nearly 25 years ago.     Migraines behind one eye, usually the left eye and in the Lt temple. There is a hard palpable pressure in the left temple, ' like a piece of wire is there". Seeks dark, quiet and cold. AC helps. + N, occ vomiting. No aura. The eye droops and looks red.     OTC meds---overused BC's for 'most of my life', but none times 3 months. Had a upper GI scope yesterday, pending.   Best tx was SC imitrex but headache did rebound.           MRI Brain W WO Contrast  Narrative & Impression  EXAMINATION:  MRI BRAIN W WO CONTRAST     CLINICAL HISTORY:  G40.909;Epilepsy, unspecified, not intractable, without status epilepticus     TECHNIQUE:  Multiplanar multisequence MR imaging of the brain was performed before and after the administration of 9 mL Gadavist intravenous contrast. Additional thin cut images were performed through the hippocampi per epilepsy protocol.     COMPARISON:  Head CT 08/12/2019     FINDINGS:  Intracranial Compartment:     Ventricles and sulci are normal in size for age without evidence of hydrocephalus. No extra-axial blood or fluid collections.     The brain parenchyma appears normal. No mass lesion, acute hemorrhage, edema, or acute infarct. Small linear focus of enhancement in the right temporal lobe may represent a small DVA.  Otherwise, no abnormal enhancement.     Hippocampi have normal and symmetric architecture and signal.  No evidence of cortical migrational abnormality.     Normal vascular flow voids are preserved.     Skull/Extracranial Contents (limited evaluation): Bone marrow signal intensity is normal.  Mucosal thickening involving the left maxillary sinus.  Bilateral mastoid air cells are clear.     Impression:     No epileptogenic focus identified.     Left maxillary sinus disease.        Electronically signed " by: Rogelio Saleem  Date:                                            10/18/2019  Time:                                           10:27                    Past Medical History:   Diagnosis Date    ETOH abuse     Hypertension     Pancreatitis     Seizures       Past Surgical History:   Procedure Laterality Date    LEFT HEART CATHETERIZATION Left 10/07/2019    Procedure: Left heart cath;  Surgeon: Sergio Ge MD;  Location: [facility] CATH LAB;  Service: Cardiology;  Laterality: Left;        Current Outpatient Medications:     chlorzoxazone (PARAFON FORTE) 500 mg Tab, , Disp: , Rfl:     diazePAM (VALIUM) 5 MG tablet, , Disp: , Rfl:     EScitalopram oxalate (LEXAPRO) 20 MG tablet, , Disp: , Rfl:     hydrOXYzine pamoate (VISTARIL) 50 MG Cap, Take 50 mg by mouth every 6 (six) hours as needed., Disp: , Rfl:     lisinopriL (PRINIVIL,ZESTRIL) 20 MG tablet, Take 20 mg by mouth once daily., Disp: , Rfl:     mirtazapine (REMERON) 15 MG tablet, , Disp: , Rfl:     omeprazole (PRILOSEC) 40 MG capsule, Take 40 mg by mouth once daily., Disp: , Rfl:     ondansetron (ZOFRAN-ODT) 4 MG TbDL, , Disp: , Rfl:     potassium chloride (K-TAB) 20 mEq, , Disp: , Rfl:     SUBOXONE 8-2 mg, Place 1 each under the tongue 2 (two) times daily., Disp: , Rfl:     torsemide (DEMADEX) 20 MG Tab, Take 20 mg by mouth 2 (two) times daily., Disp: , Rfl:     acamprosate (CAMPRAL) 333 mg tablet, Take 666 mg by mouth 3 (three) times daily., Disp: , Rfl:     divalproex (DEPAKOTE) 250 MG EC tablet, 3 po q 12 hour, Disp: 180 tablet, Rfl: 3    hydroCHLOROthiazide (HYDRODIURIL) 25 MG tablet, Take 1 tablet (25 mg total) by mouth once daily., Disp: 30 tablet, Rfl: 0    pantoprazole (PROTONIX) 40 MG tablet, Take 1 tablet (40 mg total) by mouth once daily., Disp: 30 tablet, Rfl: 11    rizatriptan (MAXALT) 10 MG tablet, One po at onset of migraine. May repeat q 2 hour prn. No more than 3 in 24 hours, Disp: 12 tablet, Rfl: 2    zonisamide (ZONEGRAN) 50 MG Cap, Take 1  capsule (50 mg total) by mouth once daily for 7 days, THEN 2 capsules (100 mg total) once daily for 7 days, THEN 3 capsules (150 mg total) once daily for 14 days., Disp: 63 capsule, Rfl: 0   Review of patient's allergies indicates:   Allergen Reactions    Penicillins         Review of Systems   Gastrointestinal:  Positive for nausea.   Neurological:  Positive for seizures, headaches and memory loss. Negative for speech difficulty, weakness and numbness.   Psychiatric/Behavioral:  Positive for sleep disturbance. The patient is nervous/anxious.          Objective:      Physical Exam  Neurological:      Mental Status: He is alert.      Cranial Nerves: No cranial nerve deficit or dysarthria.      Motor: No weakness, tremor, abnormal muscle tone or pronator drift.      Coordination: Coordination normal.      Gait: Gait normal.   Psychiatric:         Behavior: Behavior normal.         Thought Content: Thought content normal.         Assessment:       1. Other generalized epilepsy, not intractable, without status epilepticus    2. Chronic migraine w/o aura w/o status migrainosus, not intractable        Plan:         GTC szrs since age 14, no aura and no focality afterwards.   ? Focal onset with impaired awareness with rapid secondary generalization, vs primary generalized.    Plan  Continue VPA for now, with plans to taper off if does well with addition of ZNS; today will increase from 500/250 to 1500 ER qhs and check level in one week.   Add ZNS ( which should also help with sleep and headaches)  Reminded no driving, showers instead of tub baths, do not swim alone. Avoid ladders/heights ( has GTC szrs with no warning, suddenly falls)      Chronic migraine, superimposed on near daily headache, lifelong.   Imitrex shots worked but the headache rebounded. Note had a normal coronary angiogram a few years ago, by his report.  Plan change to [place]oco[place]    Notes dx with a 'cyst or tumor' of the pancreas, 4 years ago. Had a scope  with biopsy yesterday at [facility] ( Dr Capo Holley, [phone]    Subjective short term memory dysfunction, short and long term. Sleep deprivations and lifelong szrs relevant.    Plan MOCA RFU.  Check lab.MRI brain 2019 normal except ? Rt temporal DVA      Paola Ponce MD   11/22/2022   1:48 PM

## 2022-11-30 ENCOUNTER — LAB VISIT (OUTPATIENT)
Dept: LAB | Facility: HOSPITAL | Age: 47
End: 2022-11-30
Attending: PSYCHIATRY & NEUROLOGY
Payer: MEDICAID

## 2022-11-30 DIAGNOSIS — R06.02 SHORTNESS OF BREATH: ICD-10-CM

## 2022-11-30 DIAGNOSIS — G40.409 OTHER GENERALIZED EPILEPSY, NOT INTRACTABLE, WITHOUT STATUS EPILEPTICUS: ICD-10-CM

## 2022-11-30 DIAGNOSIS — R06.09 DYSPNEA ON EXERTION: Primary | ICD-10-CM

## 2022-11-30 DIAGNOSIS — R41.3 MEMORY CHANGE: ICD-10-CM

## 2022-11-30 LAB
FOLATE SERPL-MCNC: 13.9 NG/ML (ref 4–24)
RPR SER QL: NORMAL
VALPROATE SERPL-MCNC: 32.5 UG/ML (ref 50–100)
VIT B12 SERPL-MCNC: 690 PG/ML (ref 210–950)

## 2022-11-30 PROCEDURE — 80164 ASSAY DIPROPYLACETIC ACD TOT: CPT | Performed by: PSYCHIATRY & NEUROLOGY

## 2022-11-30 PROCEDURE — 82746 ASSAY OF FOLIC ACID SERUM: CPT | Performed by: PSYCHIATRY & NEUROLOGY

## 2022-11-30 PROCEDURE — 82607 VITAMIN B-12: CPT | Performed by: PSYCHIATRY & NEUROLOGY

## 2022-11-30 PROCEDURE — 86592 SYPHILIS TEST NON-TREP QUAL: CPT | Performed by: PSYCHIATRY & NEUROLOGY

## 2022-11-30 PROCEDURE — 84425 ASSAY OF VITAMIN B-1: CPT | Performed by: PSYCHIATRY & NEUROLOGY

## 2022-12-05 LAB — VIT B1 BLD-SCNC: 124.7 NMOL/L (ref 66.5–200)

## 2022-12-06 ENCOUNTER — HOSPITAL ENCOUNTER (OUTPATIENT)
Dept: NEUROLOGY | Facility: HOSPITAL | Age: 47
Discharge: HOME | End: 2022-12-06
Attending: PSYCHIATRY & NEUROLOGY
Payer: MEDICAID

## 2022-12-06 DIAGNOSIS — G40.409 OTHER GENERALIZED EPILEPSY, NOT INTRACTABLE, WITHOUT STATUS EPILEPTICUS: ICD-10-CM

## 2022-12-06 PROCEDURE — 95816 PR EEG,W/AWAKE & DROWSY RECORD: ICD-10-PCS | Mod: 26,,, | Performed by: PSYCHIATRY & NEUROLOGY

## 2022-12-06 PROCEDURE — 95816 EEG AWAKE AND DROWSY: CPT | Mod: 26,,, | Performed by: PSYCHIATRY & NEUROLOGY

## 2022-12-06 PROCEDURE — 95819 EEG AWAKE AND ASLEEP: CPT

## 2022-12-12 ENCOUNTER — HOSPITAL ENCOUNTER (OUTPATIENT)
Dept: PULMONOLOGY | Facility: HOSPITAL | Age: 47
Discharge: HOME | End: 2022-12-12
Attending: INTERNAL MEDICINE
Payer: MEDICAID

## 2022-12-12 DIAGNOSIS — R06.09 DYSPNEA ON EXERTION: ICD-10-CM

## 2022-12-12 DIAGNOSIS — R06.02 SHORTNESS OF BREATH: ICD-10-CM

## 2022-12-12 PROCEDURE — 94727 GAS DIL/WSHOT DETER LNG VOL: CPT

## 2022-12-12 PROCEDURE — 94729 DIFFUSING CAPACITY: CPT

## 2022-12-12 PROCEDURE — 94010 BREATHING CAPACITY TEST: CPT

## 2022-12-16 PROBLEM — G40.909 EPILEPSY: Status: ACTIVE | Noted: 2022-12-16

## 2022-12-17 NOTE — PROCEDURES
DATE: 12/6/22    EEG NUMBER: ON 22- 514    REFERRING PHYSICIAN:  Dr. Ponce     This EEG was performed to assess for subclinical seizures      ELECTROENCEPHALOGRAM REPORT     METHODOLOGY:  Electroencephalographic (EEG) recording is with electrodes placed according to the International 10-20 placement system.  Thirty two (32) channels of digital signal are simultaneously recorded from the scalp and may include EKG, EMG, and/or eye monitors.   Recording band pass was 0.1 to 512 hz.  Digital video recording of the patient is simultaneously recorded with the EEG.  The nursing staff report clinical symptoms and may press an event button when the patient has symptoms of clinical interest to the treating physicians.  EEG and video recording is stored and archived in digital format.  The entire recording is visually reviewed, and the times identified by computer analysis as being spikes or seizures are reviewed again.  Activation procedures which include photic stimulation, hyperventilation and instructing patients to perform simple task are done in selected patients.   Compresses spectral analysis (CSA) is also performed on the activity recorded from each individual channel.  This is displayed as a power display of frequencies from 0 to 30 Hz over time.   The CSA analysis is done and displayed continuously.  This is reviewed for asymmetries in power between homologous areas of the scalp and for presence of changes in power which can be seen when seizures occur.  Sections of suspected abnormalities on the CSA is then compared with the original EEG recording.                Newser software was also utilized in the review of this study.  This software suite analyzes the EEG recording in multiple domains.  Coherence and rhythmicity is computed to identify EEG sections which may contain organized seizures.  Each channel undergoes analysis to detect presence of spike and sharp waves which have special and morphological  characteristic of epileptic activity.  The routine EEG recording is converted from spacial into frequency domain.  This is then displayed comparing homologous areas to identify areas of significant asymmetry.  Algorithm to identify non-cortically generated artifact is used to separate eye movement, EMG and other artifact from the EEG.     EEG FINDINGS:  The recording was obtained with a number of standard bipolar and referential montages during wakefulness, drowsiness.  In the alert state, the posterior background rhythm was a symmetric, well-modulated 9 Hz activity, which reacted symmetrically to eye opening.  Intermittent photic stimulation failed to evoke symmetric posterior driving responses. No abnormalities were activated by photic stimulation.  During drowsiness, the background rhythm waxed and waned and there were periods of slowing.  Independent left and right temporal focal polymorphic mixed delta range slowing was noted.  There were no interictal epileptiform abnormalities and no clinical or electrographic seizures were recorded.    The EKG channel revealed a sinus rhythm.     IMPRESSION:  This is an abnormal EEG during wakefulness, drowsiness. Independent left and right temporal focal polymorphic mixed delta range slowing was noted.     CLINICAL CORRELATION:  The patient is a  47 year-old male with a history of epilepsy who is currently maintained on Depakote and zonisamide.  This is an abnormal EEG during wakefulness and drowsiness.  The presence of independent left right frontotemporal focal slowing suggestive focal neural dysfunction in these regions. There is no evidence of an epileptic process on this recording.  No seizures were recorded during this study.

## 2023-01-30 ENCOUNTER — TELEPHONE (OUTPATIENT)
Dept: NEUROLOGY | Facility: CLINIC | Age: 48
End: 2023-01-30
Payer: MEDICAID

## 2024-02-21 ENCOUNTER — OFFICE (OUTPATIENT)
Dept: URBAN - METROPOLITAN AREA CLINIC 13 | Facility: CLINIC | Age: 49
End: 2024-02-21
Payer: MEDICAID

## 2024-02-21 VITALS
HEART RATE: 107 BPM | HEIGHT: 70 IN | DIASTOLIC BLOOD PRESSURE: 80 MMHG | WEIGHT: 159 LBS | SYSTOLIC BLOOD PRESSURE: 120 MMHG

## 2024-02-21 DIAGNOSIS — K21.9 GASTRO-ESOPHAGEAL REFLUX DISEASE WITHOUT ESOPHAGITIS: ICD-10-CM

## 2024-02-21 DIAGNOSIS — R10.84 GENERALIZED ABDOMINAL PAIN: ICD-10-CM

## 2024-02-21 DIAGNOSIS — R13.19 OTHER DYSPHAGIA: ICD-10-CM

## 2024-02-21 DIAGNOSIS — R14.0 ABDOMINAL DISTENSION (GASEOUS): ICD-10-CM

## 2024-02-21 DIAGNOSIS — F10.20 ALCOHOL DEPENDENCE, UNCOMPLICATED: ICD-10-CM

## 2024-02-21 PROCEDURE — 99204 OFFICE O/P NEW MOD 45 MIN: CPT | Performed by: INTERNAL MEDICINE

## 2024-02-21 RX ORDER — OMEPRAZOLE 40 MG/1
40 CAPSULE, DELAYED RELEASE ORAL
Qty: 30 | Refills: 5 | Status: ACTIVE
Start: 2024-02-21

## 2024-03-18 ENCOUNTER — AMBULATORY SURGICAL CENTER (OUTPATIENT)
Dept: URBAN - METROPOLITAN AREA SURGERY 4 | Facility: SURGERY | Age: 49
End: 2024-03-18
Payer: MEDICAID

## 2024-03-18 ENCOUNTER — OFFICE (OUTPATIENT)
Dept: URBAN - METROPOLITAN AREA PATHOLOGY 1 | Facility: PATHOLOGY | Age: 49
End: 2024-03-18
Payer: MEDICAID

## 2024-03-18 VITALS
WEIGHT: 150 LBS | SYSTOLIC BLOOD PRESSURE: 116 MMHG | TEMPERATURE: 98.5 F | RESPIRATION RATE: 16 BRPM | SYSTOLIC BLOOD PRESSURE: 103 MMHG | DIASTOLIC BLOOD PRESSURE: 89 MMHG | DIASTOLIC BLOOD PRESSURE: 105 MMHG | HEART RATE: 98 BPM | HEART RATE: 110 BPM | HEART RATE: 110 BPM | DIASTOLIC BLOOD PRESSURE: 75 MMHG | RESPIRATION RATE: 17 BRPM | RESPIRATION RATE: 11 BRPM | RESPIRATION RATE: 13 BRPM | RESPIRATION RATE: 13 BRPM | RESPIRATION RATE: 18 BRPM | HEART RATE: 88 BPM | DIASTOLIC BLOOD PRESSURE: 90 MMHG | DIASTOLIC BLOOD PRESSURE: 64 MMHG | SYSTOLIC BLOOD PRESSURE: 103 MMHG | DIASTOLIC BLOOD PRESSURE: 105 MMHG | DIASTOLIC BLOOD PRESSURE: 55 MMHG | HEIGHT: 70 IN | TEMPERATURE: 98.5 F | HEART RATE: 92 BPM | HEART RATE: 90 BPM | RESPIRATION RATE: 15 BRPM | DIASTOLIC BLOOD PRESSURE: 90 MMHG | DIASTOLIC BLOOD PRESSURE: 89 MMHG | RESPIRATION RATE: 17 BRPM | DIASTOLIC BLOOD PRESSURE: 83 MMHG | DIASTOLIC BLOOD PRESSURE: 71 MMHG | RESPIRATION RATE: 18 BRPM | DIASTOLIC BLOOD PRESSURE: 83 MMHG | OXYGEN SATURATION: 92 % | DIASTOLIC BLOOD PRESSURE: 71 MMHG | HEART RATE: 114 BPM | OXYGEN SATURATION: 97 % | RESPIRATION RATE: 12 BRPM | OXYGEN SATURATION: 99 % | RESPIRATION RATE: 16 BRPM | SYSTOLIC BLOOD PRESSURE: 145 MMHG | OXYGEN SATURATION: 98 % | HEART RATE: 77 BPM | HEART RATE: 92 BPM | SYSTOLIC BLOOD PRESSURE: 127 MMHG | HEART RATE: 88 BPM | DIASTOLIC BLOOD PRESSURE: 64 MMHG | HEART RATE: 91 BPM | DIASTOLIC BLOOD PRESSURE: 79 MMHG | OXYGEN SATURATION: 92 % | HEART RATE: 84 BPM | HEIGHT: 70 IN | SYSTOLIC BLOOD PRESSURE: 153 MMHG | SYSTOLIC BLOOD PRESSURE: 149 MMHG | OXYGEN SATURATION: 98 % | RESPIRATION RATE: 12 BRPM | SYSTOLIC BLOOD PRESSURE: 138 MMHG | SYSTOLIC BLOOD PRESSURE: 109 MMHG | RESPIRATION RATE: 27 BRPM | OXYGEN SATURATION: 97 % | SYSTOLIC BLOOD PRESSURE: 114 MMHG | DIASTOLIC BLOOD PRESSURE: 69 MMHG | SYSTOLIC BLOOD PRESSURE: 153 MMHG | HEART RATE: 84 BPM | RESPIRATION RATE: 27 BRPM | SYSTOLIC BLOOD PRESSURE: 149 MMHG | SYSTOLIC BLOOD PRESSURE: 145 MMHG | SYSTOLIC BLOOD PRESSURE: 138 MMHG | HEART RATE: 90 BPM | HEART RATE: 93 BPM | SYSTOLIC BLOOD PRESSURE: 116 MMHG | OXYGEN SATURATION: 94 % | RESPIRATION RATE: 11 BRPM | WEIGHT: 150 LBS | OXYGEN SATURATION: 94 % | HEART RATE: 77 BPM | OXYGEN SATURATION: 89 % | HEART RATE: 114 BPM | DIASTOLIC BLOOD PRESSURE: 75 MMHG | DIASTOLIC BLOOD PRESSURE: 79 MMHG | SYSTOLIC BLOOD PRESSURE: 127 MMHG | OXYGEN SATURATION: 89 % | HEART RATE: 93 BPM | OXYGEN SATURATION: 99 % | DIASTOLIC BLOOD PRESSURE: 55 MMHG | SYSTOLIC BLOOD PRESSURE: 114 MMHG | HEART RATE: 98 BPM | DIASTOLIC BLOOD PRESSURE: 69 MMHG | RESPIRATION RATE: 15 BRPM | SYSTOLIC BLOOD PRESSURE: 109 MMHG | HEART RATE: 91 BPM

## 2024-03-18 DIAGNOSIS — K31.89 OTHER DISEASES OF STOMACH AND DUODENUM: ICD-10-CM

## 2024-03-18 DIAGNOSIS — K63.5 POLYP OF COLON: ICD-10-CM

## 2024-03-18 DIAGNOSIS — K21.9 GASTRO-ESOPHAGEAL REFLUX DISEASE WITHOUT ESOPHAGITIS: ICD-10-CM

## 2024-03-18 DIAGNOSIS — K29.30 CHRONIC SUPERFICIAL GASTRITIS WITHOUT BLEEDING: ICD-10-CM

## 2024-03-18 DIAGNOSIS — R10.84 GENERALIZED ABDOMINAL PAIN: ICD-10-CM

## 2024-03-18 DIAGNOSIS — R13.19 OTHER DYSPHAGIA: ICD-10-CM

## 2024-03-18 DIAGNOSIS — D12.4 BENIGN NEOPLASM OF DESCENDING COLON: ICD-10-CM

## 2024-03-18 DIAGNOSIS — R14.0 ABDOMINAL DISTENSION (GASEOUS): ICD-10-CM

## 2024-03-18 DIAGNOSIS — D12.3 BENIGN NEOPLASM OF TRANSVERSE COLON: ICD-10-CM

## 2024-03-18 PROCEDURE — 88305 TISSUE EXAM BY PATHOLOGIST: CPT | Performed by: PATHOLOGY

## 2024-03-18 PROCEDURE — 43450 DILATE ESOPHAGUS 1/MULT PASS: CPT | Performed by: INTERNAL MEDICINE

## 2024-03-18 PROCEDURE — 43239 EGD BIOPSY SINGLE/MULTIPLE: CPT | Performed by: INTERNAL MEDICINE

## 2024-03-18 PROCEDURE — 88341 IMHCHEM/IMCYTCHM EA ADD ANTB: CPT | Performed by: PATHOLOGY

## 2024-03-18 PROCEDURE — 45385 COLONOSCOPY W/LESION REMOVAL: CPT | Performed by: INTERNAL MEDICINE

## 2024-03-18 PROCEDURE — 88342 IMHCHEM/IMCYTCHM 1ST ANTB: CPT | Performed by: PATHOLOGY

## 2024-03-21 LAB
PDF: PDF REPORT: (no result)
PDF: PDF REPORT: (no result)

## 2024-04-17 ENCOUNTER — OFFICE (OUTPATIENT)
Dept: URBAN - METROPOLITAN AREA CLINIC 13 | Facility: CLINIC | Age: 49
End: 2024-04-17
Payer: MEDICAID

## 2024-04-17 VITALS
WEIGHT: 144 LBS | SYSTOLIC BLOOD PRESSURE: 120 MMHG | HEART RATE: 100 BPM | HEIGHT: 70 IN | DIASTOLIC BLOOD PRESSURE: 80 MMHG

## 2024-04-17 DIAGNOSIS — R10.12 LEFT UPPER QUADRANT PAIN: ICD-10-CM

## 2024-04-17 DIAGNOSIS — B18.2 CHRONIC VIRAL HEPATITIS C: ICD-10-CM

## 2024-04-17 DIAGNOSIS — F10.20 ALCOHOL DEPENDENCE, UNCOMPLICATED: ICD-10-CM

## 2024-04-17 DIAGNOSIS — R14.0 ABDOMINAL DISTENSION (GASEOUS): ICD-10-CM

## 2024-04-17 DIAGNOSIS — K21.9 GASTRO-ESOPHAGEAL REFLUX DISEASE WITHOUT ESOPHAGITIS: ICD-10-CM

## 2024-04-17 PROBLEM — K63.5 POLYP OF COLON: Status: ACTIVE | Noted: 2024-03-18

## 2024-04-17 PROCEDURE — 99214 OFFICE O/P EST MOD 30 MIN: CPT | Performed by: INTERNAL MEDICINE
